# Patient Record
Sex: FEMALE | Race: WHITE | NOT HISPANIC OR LATINO | Employment: PART TIME | URBAN - METROPOLITAN AREA
[De-identification: names, ages, dates, MRNs, and addresses within clinical notes are randomized per-mention and may not be internally consistent; named-entity substitution may affect disease eponyms.]

---

## 2017-01-04 ENCOUNTER — HOSPITAL ENCOUNTER (OUTPATIENT)
Dept: OCCUPATIONAL THERAPY | Facility: CLINIC | Age: 47
Setting detail: THERAPIES SERIES
End: 2017-01-04
Attending: ORTHOPAEDIC SURGERY
Payer: COMMERCIAL

## 2017-01-04 PROCEDURE — 97035 APP MDLTY 1+ULTRASOUND EA 15: CPT | Mod: GO | Performed by: OCCUPATIONAL THERAPIST

## 2017-01-04 PROCEDURE — 40000839 ZZH STATISTIC HAND THERAPY VISIT: Performed by: OCCUPATIONAL THERAPIST

## 2017-01-04 PROCEDURE — 97110 THERAPEUTIC EXERCISES: CPT | Mod: GO | Performed by: OCCUPATIONAL THERAPIST

## 2017-01-04 PROCEDURE — 97140 MANUAL THERAPY 1/> REGIONS: CPT | Mod: GO | Performed by: OCCUPATIONAL THERAPIST

## 2017-01-06 ENCOUNTER — HOSPITAL ENCOUNTER (OUTPATIENT)
Dept: OCCUPATIONAL THERAPY | Facility: CLINIC | Age: 47
Setting detail: THERAPIES SERIES
End: 2017-01-06
Attending: ORTHOPAEDIC SURGERY
Payer: COMMERCIAL

## 2017-01-06 PROCEDURE — 40000839 ZZH STATISTIC HAND THERAPY VISIT: Performed by: OCCUPATIONAL THERAPIST

## 2017-01-06 PROCEDURE — 97035 APP MDLTY 1+ULTRASOUND EA 15: CPT | Mod: GO | Performed by: OCCUPATIONAL THERAPIST

## 2017-01-06 PROCEDURE — 97140 MANUAL THERAPY 1/> REGIONS: CPT | Mod: GO | Performed by: OCCUPATIONAL THERAPIST

## 2017-01-06 PROCEDURE — 97110 THERAPEUTIC EXERCISES: CPT | Mod: GO | Performed by: OCCUPATIONAL THERAPIST

## 2017-01-12 ENCOUNTER — HOSPITAL ENCOUNTER (OUTPATIENT)
Dept: OCCUPATIONAL THERAPY | Facility: CLINIC | Age: 47
Setting detail: THERAPIES SERIES
End: 2017-01-12
Attending: ORTHOPAEDIC SURGERY
Payer: COMMERCIAL

## 2017-01-12 PROCEDURE — 97035 APP MDLTY 1+ULTRASOUND EA 15: CPT | Mod: GO | Performed by: OCCUPATIONAL THERAPIST

## 2017-01-12 PROCEDURE — 40000839 ZZH STATISTIC HAND THERAPY VISIT: Performed by: OCCUPATIONAL THERAPIST

## 2017-01-12 PROCEDURE — 97140 MANUAL THERAPY 1/> REGIONS: CPT | Mod: GO | Performed by: OCCUPATIONAL THERAPIST

## 2017-01-12 PROCEDURE — 97110 THERAPEUTIC EXERCISES: CPT | Mod: GO | Performed by: OCCUPATIONAL THERAPIST

## 2017-01-12 NOTE — PROGRESS NOTES
Occupational Therapy Progress Note     01/12/17 0810   Notes   Note Type Progress Note   Signing Clinician's Name / Credentials   Signing clinician's name / credentials Nita CLINTON/L   Providers   Referring Physician Dr Yuli Pinto   General Information   Rxs Authorized 20    Rxs Used 10/16   Medical Diagnosis s/p wrist surgery Z98.890   Orders Evaluate And Treat As Indicated;Other   Other Orders hand therapy   Insurance HP   Start Of Care Date 12/09/16   Onset date of current episode/exacerbation 12/01/16   Surgical procedure external fixation distal radius   Date of surgery 10/10/16   Date for Next MD Appointment 01/25/17   Precautions/Limitations decrease wrist brace wear as needed, still part time at work through end of January   Subjective Measures   Current Pain level 4/10   Patient Reported % Functional Improvement 50%   Functional Improvement Reported Self care activities;Leisure Activities;Gripping;Carrying;Prehension   QuickDASH [Functional Disability Questionnaire; 0-100 (0=no dysfunction; 100=dysfunction)] (UEFI: 55/80)   Objective Measures   Objective Measures Strength   Edema   Location (anatomical) wrist    Location Left   Edema Comments 17.0 cm   ROM   Location (anatomical) wrist   Location Left   Motion Ext/Flex   ROM Comments 52/56 AROM/PROM  extension and flexion  53/55 AROM/PROM, WNL Pro/sup extension/flexion AROM/PROM  (improved)   Strength   Location Left    18# and right 45#   Chavez Pinch 9# and 15#  (improved)   Lateral Pinch 8# and right 16#  (improved)   Modalities   Modalities Ultrasound   Ultrasound -Type Pulsed 50%;2 cm sound head   Skilled Interventions To Increase Blood Flow For Improved Healing;To Increase Tissue Extensibility;To Enhance Tendon Gliding;To Enhance Joint Rom;To Decrease Pain;To Decrease Inflammation   Intensity .8 cm2   Duration (does not include the 3-5 min set up/clean up time) 10 min   Frequency 3 MHz   Location left wrist  volar and dorsum , and hand   Positioning sitting   Therapeutic Exercise   Therapeutic Exercise PROM;AAROM;Special Techniques   Skilled Interventions To Increase Blood Flow For Improved Healing;To Increase Tissue Extensibility;To Enhance Joint Rom;To Decrease Edema   Minutes of Treatment 20   AAROM   AAROM Fingers All Planes   Sets/Reps 1 set;10 reps   PROM   PROM PROM Forearm;PROM Wrist   PROM Wrist All Planes   Sets/Reps 1 set;10 reps   PROM Forearm All Planes   Sets/Reps 1 set;10 reps   Other Exer/Activities/Educ   Exercise 1 reassess ; refer to objectives and goal update   Exercise 2 velcro board   Description 2 large cylinder,, large key and medium cylinder 3-5 x each direction with the left hand   Manual   Manual MEM;STM   Skilled Interventions To Decrease Edema   Minutes of Treatment 5   STM 1st dc   Position Sitting   Description/ Technique moderate pressure applied to volar and dorsum distal left wrist.   Time 10   MEM hand;forearm   Position Sitting   Description/ Technique MEM to dorsal hand through to forearm and elbow to decrease swelling.    Time 15   Hand Goals   Hand Goals Dressing;Hygiene/Toileting;Household Chores;Work   Dressing   Current Functional Task Dressing UB;Dressing LB   Previous Performance Level Independent   Current Performance Level Mild difficulty   Goal Target Task Don/Doff bra;Don/Doff socks;Don/Doff pants;[place] pants;Button shirt;Tie shoes;Zipping;Don/doff gloves   Goal Target Performance Level Mild difficulty   Due Date 02/09/17   Date Goal Met 01/12/17   Hygiene/Toileting   Current Functional Task Styling;Washing back;Brushing/combing hair   Previous Performance Level Independent   Current Performance Level Mild difficulty   Goal Target Task Hold brush/comb and brush/comb hair;Style hair;Reach around to wipe   Goal Target Performance Level No difficulty   Due Date 02/09/17   Household Chores   Current Functional Task Washing and drying dishes;Sweeping;Gripping;Turning;Pushing    Previous Performance Level Independent   Current Performance Level Mild difficulty   Goal Target Task Hold dish rag and wash dishes;Sweep floors;Weight bear through hand to wash floors;Open a tight or new jar;Push a shopping cart   Goal Target Performance Level Mild difficulty   Due Date 02/09/17   Work   Current Functional Task Computer use;Keyboarding;Repetitive tasks   Previous Performance Level Independent   Current Performance Level Moderate difficulty   Goal Target Task Type;Mouse   Goal Target Performance Level No difficulty   Due Date 02/09/17   Assessment   Clinical Impression(s) Comments Improved functional use fo the left hand/arm for daily tasks/activities.   Response to Therapy: Improvements ROM;Flexibility;Edema;Pain;Self Care Skills;Sensitivity   Plan   Plan US, STM/MEM, TE/TA and HEP   Total Session Time   Total Session Time (In Minutes) 45       Thank you for referring Lilian To OT services to assist with improve function use of the left hand.    If you have any questions or concerns, please contact me at [phone].    Nita Gonzalez MA, OTR/L  [facility]

## 2017-01-13 ENCOUNTER — HOSPITAL ENCOUNTER (OUTPATIENT)
Dept: OCCUPATIONAL THERAPY | Facility: CLINIC | Age: 47
Setting detail: THERAPIES SERIES
End: 2017-01-13
Attending: ORTHOPAEDIC SURGERY
Payer: COMMERCIAL

## 2017-01-13 PROCEDURE — 40000839 ZZH STATISTIC HAND THERAPY VISIT: Performed by: OCCUPATIONAL THERAPIST

## 2017-01-13 PROCEDURE — 97035 APP MDLTY 1+ULTRASOUND EA 15: CPT | Mod: GO | Performed by: OCCUPATIONAL THERAPIST

## 2017-01-13 PROCEDURE — 97110 THERAPEUTIC EXERCISES: CPT | Mod: GO | Performed by: OCCUPATIONAL THERAPIST

## 2017-01-13 PROCEDURE — 97140 MANUAL THERAPY 1/> REGIONS: CPT | Mod: GO | Performed by: OCCUPATIONAL THERAPIST

## 2017-01-18 ENCOUNTER — HOSPITAL ENCOUNTER (OUTPATIENT)
Dept: OCCUPATIONAL THERAPY | Facility: CLINIC | Age: 47
Setting detail: THERAPIES SERIES
End: 2017-01-18
Attending: ORTHOPAEDIC SURGERY
Payer: COMMERCIAL

## 2017-01-18 PROCEDURE — 40000839 ZZH STATISTIC HAND THERAPY VISIT: Performed by: OCCUPATIONAL THERAPIST

## 2017-01-18 PROCEDURE — 97110 THERAPEUTIC EXERCISES: CPT | Mod: GO | Performed by: OCCUPATIONAL THERAPIST

## 2017-01-18 PROCEDURE — 97140 MANUAL THERAPY 1/> REGIONS: CPT | Mod: GO | Performed by: OCCUPATIONAL THERAPIST

## 2017-01-18 PROCEDURE — 97035 APP MDLTY 1+ULTRASOUND EA 15: CPT | Mod: GO | Performed by: OCCUPATIONAL THERAPIST

## 2017-01-20 ENCOUNTER — HOSPITAL ENCOUNTER (OUTPATIENT)
Dept: OCCUPATIONAL THERAPY | Facility: CLINIC | Age: 47
Setting detail: THERAPIES SERIES
End: 2017-01-20
Attending: ORTHOPAEDIC SURGERY
Payer: COMMERCIAL

## 2017-01-20 PROCEDURE — 97140 MANUAL THERAPY 1/> REGIONS: CPT | Mod: GO | Performed by: OCCUPATIONAL THERAPIST

## 2017-01-20 PROCEDURE — 97035 APP MDLTY 1+ULTRASOUND EA 15: CPT | Mod: GO | Performed by: OCCUPATIONAL THERAPIST

## 2017-01-20 PROCEDURE — 97110 THERAPEUTIC EXERCISES: CPT | Mod: GO | Performed by: OCCUPATIONAL THERAPIST

## 2017-01-20 PROCEDURE — 40000839 ZZH STATISTIC HAND THERAPY VISIT: Performed by: OCCUPATIONAL THERAPIST

## 2017-01-25 ENCOUNTER — RADIANT APPOINTMENT (OUTPATIENT)
Dept: GENERAL RADIOLOGY | Facility: OTHER | Age: 47
End: 2017-01-25
Attending: ORTHOPAEDIC SURGERY
Payer: COMMERCIAL

## 2017-01-25 ENCOUNTER — OFFICE VISIT (OUTPATIENT)
Dept: ORTHOPEDICS | Facility: OTHER | Age: 47
End: 2017-01-25
Payer: COMMERCIAL

## 2017-01-25 VITALS — TEMPERATURE: 98 F | WEIGHT: 222 LBS | BODY MASS INDEX: 37.9 KG/M2 | HEIGHT: 64 IN

## 2017-01-25 DIAGNOSIS — S52.592D OTHER CLOSED FRACTURE OF DISTAL END OF LEFT RADIUS WITH ROUTINE HEALING, SUBSEQUENT ENCOUNTER: Primary | ICD-10-CM

## 2017-01-25 DIAGNOSIS — S52.502D CLOSED FRACTURE OF DISTAL END OF LEFT RADIUS WITH ROUTINE HEALING: ICD-10-CM

## 2017-01-25 PROCEDURE — 73110 X-RAY EXAM OF WRIST: CPT | Mod: LT

## 2017-01-25 PROCEDURE — 99213 OFFICE O/P EST LOW 20 MIN: CPT | Performed by: ORTHOPAEDIC SURGERY

## 2017-01-25 ASSESSMENT — PAIN SCALES - GENERAL: PAINLEVEL: MILD PAIN (3)

## 2017-01-25 NOTE — Clinical Note
[address]  [address]  [facility] [address]  Phone: [phone]    January 25, 2017        Lilian Middleton  [address] [place] [address]          To whom it may concern:    RE: Lilian ROBINA Kane    Lilian is under my care and Dr. Berrios.  Lilian is still healing and will be working at a slower pace. Restrictions: Return to normal duties with right hand 6 hours per day.  May use left hand but may need breaks and at time may not be able to do all normal duties with left hand.  She must also attend her therapy sessions, this is very important for her recovery.  She will be re-evaluated in 6 weeks for these restrictions.  Its possible she could do more hours prior to 6 weeks but she would need to consult us over the phone for that.      Please contact me for questions or concerns.      Sincerely,      Bryan Mendoza PA-C

## 2017-01-25 NOTE — PATIENT INSTRUCTIONS
Encounter Diagnosis   Name Primary?     Closed fracture of distal end of left radius with routine healing Yes     Rest, ice and elevate above heart level as needed for pain control  1. We feel your x-rays look excellent today. We printed them out for you. You have healed in a good position.  2. You are making progress with your range of motion and strengthening. He still have a bit to go to have full range of motion.  3. It might take another 2-3 months to get the full range of motion and strength back.  4. We did work restrictions today.  We are up to 6 hours per day also.  Call us if you want to do more hours before our follow up.  5. Continue Occupational Therapy, this is important.  6. Do exercises on your own too.  7. We held off on pain medication today.  Ibuprofen and tylenol is fine also.  8.  Follow up with Yuli Berrios MD and/or Bryan Mendoza PA-C in 6 weeks.  We will not need xray at that time.  [employer] and SendRR may offer reliable information regarding your diagnosis and treatment plan.    THANK YOU for coming in today. If you receive a survey via [employer] or mail please let us know if there was anything you especially appreciated today or if there is any way we can improve our clinic. We appreciate your input.    GENERAL INFORMATION:  Our hours are:  Monday :     Clinic 7:30 AM-430 PM ([facility])  Tuesday:      Operating Room All Day ([facility])  Wednesday: Clinic 7:30 AM - 11:15 AM ([facility])             Clinic 1:00 PM - 4:00PM ([facility])  Thursday:     Administrative Day  Friday:          Clinic 7:30 AM - 11:15 AM ([facility])            Clinic 1:00 PM - 4:00 PM ([facility])    We are not in the office Thursdays. Therefore non- urgent calls and medical messages received on Thursday will be addressed when we are back in the office on Wednesday. Urgent  matters will be reviewed and addressed by one of our partners in the office as needed.    If lab work was done today as part of your evaluation you will generally be contacted via Tagkast, mail, or phone with the results within 1-5 days. If there is an alarming result we will contact you by phone. Lab results come back at varying times, I generally wait until all labs are resulted before making comments on results. Please note labs are automatically released to Tagkast (if you have signed up for it) once available-at times you may see these prior to my having a chance to review them as well.    If you need refills please contact your pharmacist. They will send a refill request to me to review. Please allow 3 business days for us to process all refill requests. All narcotic refills should be handled in the clinic at the time of your visit.

## 2017-01-25 NOTE — Clinical Note
[address]  [address]  [facility] [address]  Phone: [phone]    January 25, 2017        Lilian Middleton  [address] [place] [address]          To whom it may concern:    RE: Lilian Middleton    Lilian is under my care and Dr. Berrios.  Lilian is still healing and will be working at a slower pace. Restrictions: Return to normal duties with right hand 6 hours per day.  May use left hand but may need breaks and at time may not be able to do all normal duties with left hand.  She will be re-evaluated in 6 weeks for these restrictions.  Its possible she could do more hours prior to 6 weeks but she would need to consult us over the phone for that.      Please contact me for questions or concerns.      Sincerely,        Bryan Mendoza PA-C

## 2017-01-25 NOTE — PROGRESS NOTES
Orthopedic Clinic Post-Operative Note    CHIEF COMPLAINT:   Chief Complaint   Patient presents with     Surgical Followup     External Fixator Placement Left Distal Radius post op #5 dos 10/10/16        HISTORY OF PRESENT ILLNESS  Location: left wrist  Rating of Pain: 3 out of 10. Patient states the pain is getting better and also that she feels she has more energy now  Pain is better with: rest  Pain improving overall: yes  Associated Features: none  Patient concerns: work    Patient's past medical, surgical, social and family histories reviewed.     No past medical history on file.    Past Surgical History   Procedure Laterality Date     Apply external fixator upper extremity Left 10/10/2016     Procedure: APPLY EXTERNAL FIXATOR UPPER EXTREMITY;  Surgeon: Yuli Berrios MD;  Location: [facility] OR       Medications:    Current Outpatient Prescriptions on File Prior to Visit:  HYDROcodone-acetaminophen (NORCO) 5-325 MG per tablet Take 1-2 tablets by mouth 2 times daily as needed for moderate to severe pain   oxyCODONE-acetaminophen (PERCOCET) 5-325 MG per tablet Take 1 tablet by mouth every 8 hours as needed for moderate to severe pain   meloxicam (MOBIC) 7.5 MG tablet Take 1 tablet (7.5 mg) by mouth daily   Acetaminophen (TYLENOL PO) Take 500 mg by mouth as needed for mild pain or fever   traMADol (ULTRAM) 50 MG tablet Take 1 tablet (50 mg) by mouth every 6 hours as needed for moderate pain   ketoconazole (NIZORAL) 2 % cream Apply topically 2 times daily   minocycline (DYNACIN) 100 MG tablet Take 1 tablet (100 mg) by mouth 2 times daily     No current facility-administered medications on file prior to visit.    No Known Allergies    Social History     Occupational History     Not on file.     Social History Main Topics     Smoking status: Never Smoker      Smokeless tobacco: Not on file     Alcohol Use: No     Drug Use: No     Sexual Activity:     Partners: Male       Family History   Problem Relation Age of Onset  "    Other Cancer Father        REVIEW OF SYSTEMS  General: negative for, night sweats, dizziness, fatigue  Resp: No shortness of breath and no cough  CV: negative for chest pain, syncope or near-syncope  GI: negative for nausea, vomiting and diarrhea  : negative for dysuria and hematuria  Musculoskeletal: as above  Neurologic: negative for syncope   Hematologic: negative for bleeding disorder    Physical Exam:  Vitals: Temp(Src) 98  F (36.7  C)  Ht 5' 4\" (1.626 m)  Wt 222 lb (100.699 kg)  BMI 38.09 kg/m2  BMI= Body mass index is 38.09 kg/(m^2).  Constitutional: healthy, alert and no acute distress   Psychiatric: mentation appears normal and affect normal/bright  NEURO: no focal deficits  RESP: Normal with easy respirations and no use of accessory muscles to breathe, no audible wheezing or retractions  CV: regular pulse  SKIN: No erythema, rashes, excoriation, or breakdown. No evidence of infection. All external fixator incisions well-heeled   MUSCULOSKELETAL:    INSPECTION of left wrist: No gross deformities, erythema, ecchymosis, atrophy or fasciculations. Slight swelling in general around the wrist area when compared to the contralateral side    PALPATION: no tenderness on palpation of the distal radius for ulna area. DRUJ stable    ROM: patient has approximately 45  of flexion and approximately 15  of extension when compared to the contralateral side of approximately 90  flexion and 45  of extension. Patient has full supination and pronation today compared to the contralateral side. All range of motion is without catching or locking. Patient does have some pain with maximal flexion and extension.    STRENGTH: 5 out of 5  strength and 4 out of 5 best flexion and extension strength    SPECIAL TEST: none  GAIT: non-antalgic  Lymph: no palpable lymph nodes    Diagnostic Modalities:  we did 3 views of the left wrist today. Fractures healed. The position is acceptable.  Independent visualization of the images " was performed.      Impression: 3 months 15 days status post left distal radius fracture status post close reduction in external fixation    Plan:   Patient Instructions:   1. We feel your x-rays look excellent today. We printed them out for you. You have healed in a good position.  2. You are making progress with your range of motion and strengthening. He still have a bit to go to have full range of motion.  3. It might take another 2-3 months to get the full range of motion and strength back.  4. We did work restrictions today.  We are up to 6 hours per day also.  Call us if you want to do more hours before our follow up.  5. Continue Occupational Therapy, this is important.  6. Do exercises on your own too.  7. We held off on pain medication today.  Ibuprofen and tylenol is fine also.  8.  Follow up with Yuli Berrios MD and/or Bryan Mendoza PA-C in 6 weeks.  We will not need xray at that time.  Re-x-ray on return: No    Scribed by Bryan Mendoza PA-C on 1/25/2017 at 10:04 AM, based on Dr. Yuli Berrios's statements to me.    This note was dictated with [employer].    ASHA Woods MD

## 2017-01-25 NOTE — MR AVS SNAPSHOT
After Visit Summary   1/25/2017    Lilian Middleton    MRN: [id number]           Patient Information     Date Of Birth          1970        Visit Information        Provider Department      1/25/2017 9:00 AM Yuli Berrios MD [facility]        Today's Diagnoses     Closed fracture of distal end of left radius with routine healing    -  1       Care Instructions    Encounter Diagnosis   Name Primary?     Closed fracture of distal end of left radius with routine healing Yes     Rest, ice and elevate above heart level as needed for pain control  1. We feel your x-rays look excellent today. We printed them out for you. You have healed in a good position.  2. You are making progress with your range of motion and strengthening. He still have a bit to go to have full range of motion.  3. It might take another 2-3 months to get the full range of motion and strength back.  4. We did work restrictions today.  We are up to 6 hours per day also.  Call us if you want to do more hours before our follow up.  5. Continue Occupational Therapy, this is important.  6. Do exercises on your own too.  7. We held off on pain medication today.  Ibuprofen and tylenol is fine also.  8.  Follow up with Yuli Berrios MD and/or Bryan Mendoza PA-C in 6 weeks.  We will not need xray at that time.  [employer] and [employer]: [employer] may offer reliable information regarding your diagnosis and treatment plan.    THANK YOU for coming in today. If you receive a survey via Youxinpai or mail please let us know if there was anything you especially appreciated today or if there is any way we can improve our clinic. We appreciate your input.    GENERAL INFORMATION:  Our hours are:  Monday :     Clinic 7:30 AM-430 PM ([facility])  Tuesday:      Operating Room All Day ([facility])  Wednesday: Clinic 7:30 AM - 11:15 AM ([facility])             Clinic 1:00 PM -  4:00PM ([facility])  Thursday:     Administrative Day  Friday:          Clinic 7:30 AM - 11:15 AM ([facility])            Clinic 1:00 PM - 4:00 PM ([facility])    We are not in the office Thursdays. Therefore non- urgent calls and medical messages received on Thursday will be addressed when we are back in the office on Wednesday. Urgent matters will be reviewed and addressed by one of our partners in the office as needed.    If lab work was done today as part of your evaluation you will generally be contacted via MaxLinear, mail, or phone with the results within 1-5 days. If there is an alarming result we will contact you by phone. Lab results come back at varying times, I generally wait until all labs are resulted before making comments on results. Please note labs are automatically released to [employer] (if you have signed up for it) once available-at times you may see these prior to my having a chance to review them as well.    If you need refills please contact your pharmacist. They will send a refill request to me to review. Please allow 3 business days for us to process all refill requests. All narcotic refills should be handled in the clinic at the time of your visit.         Follow-ups after your visit        Your next 10 appointments already scheduled     Jan 27, 2017  7:30 AM   Treatment 45 with Nita Gonzalez, OT   [facility] Occupational Therapy ([facility])    [address] [place] [address] [address] [place] [address]   [phone]            Feb 03, 2017  7:30 AM   Treatment 45 with Cande Hassan, DIETER   [facility] Occupational Therapy ([facility])    [facility] [facility] [address] [place] [address]   [phone]            Feb 10, 2017  7:30 AM   Treatment 45 with Nita Gonzalez OT   [facility] Occupational Therapy ([facility])    [facility] [address] [place] [address]  "  [phone]            2017  7:30 AM   Treatment 45 with Nita Gonzalez OT   [facility] Occupational Therapy ([facility])    [address] [facility] [address] [place] [address] [phone]              Who to contact     If you have questions or need follow up information about today's clinic visit or your schedule please contact [facility] LUPE HARDY directly at [phone].  Normal or non-critical lab and imaging results will be communicated to you by MyChart, letter or phone within 4 business days after the clinic has received the results. If you do not hear from us within 7 days, please contact the clinic through Cometahart or phone. If you have a critical or abnormal lab result, we will notify you by phone as soon as possible.  Submit refill requests through resmio or call your pharmacy and they will forward the refill request to us. Please allow 3 business days for your refill to be completed.          Additional Information About Your Visit        resmio Information     resmio lets you send messages to your doctor, view your test results, renew your prescriptions, schedule appointments and more. To sign up, go to [email or web address] . Click on \"Log in\" on the left side of the screen, which will take you to the Welcome page. Then click on \"Sign up Now\" on the right side of the page.     You will be asked to enter the access code listed below, as well as some personal information. Please follow the directions to create your username and password.     Your access code is: [email or web address]  Expires: 2017  9:54 AM     Your access code will  in 90 days. If you need help or a new code, please call your [place] clinic or [phone].        Care EveryWhere ID     This is your Care EveryWhere ID. This could be used by other organizations to access your [place] medical records  [id number]        Your Vitals Were     Temperature Height BMI (Body Mass Index)       " "      98  F (36.7  C) 5' 4\" (1.626 m) 38.09 kg/m2          Blood Pressure from Last 3 Encounters:   10/10/16 143/64   10/05/16 116/78   10/01/16 133/83    Weight from Last 3 Encounters:   01/25/17 222 lb (100.699 kg)   12/21/16 222 lb (100.699 kg)   11/23/16 222 lb (100.699 kg)               Primary Care Provider Office Phone # Fax #    [facility] [phone]       [address] [place] [address] [address]  [facility] [address]        Thank you!     Thank you for choosing [facility]  for your care. Our goal is always to provide you with excellent care. Hearing back from our patients is one way we can continue to improve our services. Please take a few minutes to complete the written survey that you may receive in the mail after your visit with us. Thank you!             Your Updated Medication List - Protect others around you: Learn how to safely use, store and throw away your medicines at www.disposemymeds.org.          This list is accurate as of: 1/25/17  9:54 AM.  Always use your most recent med list.                   Brand Name Dispense Instructions for use    HYDROcodone-acetaminophen 5-325 MG per tablet    NORCO    40 tablet    Take 1-2 tablets by mouth 2 times daily as needed for moderate to severe pain       ketoconazole 2 % cream    NIZORAL    60 g    Apply topically 2 times daily       meloxicam 7.5 MG tablet    MOBIC    30 tablet    Take 1 tablet (7.5 mg) by mouth daily       minocycline 100 MG tablet    DYNACIN    180 tablet    Take 1 tablet (100 mg) by mouth 2 times daily       oxyCODONE-acetaminophen 5-325 MG per tablet    PERCOCET    40 tablet    Take 1 tablet by mouth every 8 hours as needed for moderate to severe pain       traMADol 50 MG tablet    ULTRAM    60 tablet    Take 1 tablet (50 mg) by mouth every 6 hours as needed for moderate pain       TYLENOL PO      Take 500 mg by mouth as needed for mild pain or fever         "

## 2017-01-25 NOTE — Clinical Note
[address]  [address]  [facility] [address]  Phone: [phone]    January 25, 2017        Lilian Middleton  [address] [place] [address]          To whom it may concern:    RE: Lilian ROBINA Middleton    Lilian is under my care and Dr. Berrios.  Lilian is still healing and will be working at a slower pace. Restrictions: Return to normal duties with right hand 6 hours per day (the 6 hours day will start on 1/30/16, she will be doing 4 hours per day until that date).  May use left hand but may need breaks and at time may not be able to do all normal duties with left hand.  She must also attend her therapy sessions, this is very important for her recovery.  She will be re-evaluated in 6 weeks for these restrictions.  Its possible she could do more hours prior to 6 weeks but she would need to consult us over the phone for that.      Please contact me for questions or concerns.      Sincerely,        Bryan Mendoza PA-C

## 2017-01-25 NOTE — NURSING NOTE
"Chief Complaint   Patient presents with     Surgical Followup     External Fixator Placement Left Distal Radius post op #5 dos 10/10/16        Initial Temp(Src) 98  F (36.7  C)  Ht 1.626 m (5' 4\")  Wt 100.699 kg (222 lb)  BMI 38.09 kg/m2 Estimated body mass index is 38.09 kg/(m^2) as calculated from the following:    Height as of this encounter: 1.626 m (5' 4\").    Weight as of this encounter: 100.699 kg (222 lb).  BP completed using cuff size: NA (Not Taken)    Agustina Sigala MA      "

## 2017-01-27 ENCOUNTER — HOSPITAL ENCOUNTER (OUTPATIENT)
Dept: OCCUPATIONAL THERAPY | Facility: CLINIC | Age: 47
Setting detail: THERAPIES SERIES
End: 2017-01-27
Attending: ORTHOPAEDIC SURGERY
Payer: COMMERCIAL

## 2017-01-27 PROCEDURE — 40000839 ZZH STATISTIC HAND THERAPY VISIT: Performed by: OCCUPATIONAL THERAPIST

## 2017-01-27 PROCEDURE — 97140 MANUAL THERAPY 1/> REGIONS: CPT | Mod: GO | Performed by: OCCUPATIONAL THERAPIST

## 2017-01-27 PROCEDURE — 97110 THERAPEUTIC EXERCISES: CPT | Mod: GO | Performed by: OCCUPATIONAL THERAPIST

## 2017-01-27 PROCEDURE — 97035 APP MDLTY 1+ULTRASOUND EA 15: CPT | Mod: GO | Performed by: OCCUPATIONAL THERAPIST

## 2017-02-03 ENCOUNTER — HOSPITAL ENCOUNTER (OUTPATIENT)
Dept: OCCUPATIONAL THERAPY | Facility: CLINIC | Age: 47
Setting detail: THERAPIES SERIES
End: 2017-02-03
Attending: ORTHOPAEDIC SURGERY
Payer: COMMERCIAL

## 2017-02-03 PROCEDURE — 40000839 ZZH STATISTIC HAND THERAPY VISIT: Performed by: OCCUPATIONAL THERAPIST

## 2017-02-03 PROCEDURE — 97140 MANUAL THERAPY 1/> REGIONS: CPT | Mod: GO | Performed by: OCCUPATIONAL THERAPIST

## 2017-02-03 PROCEDURE — 97035 APP MDLTY 1+ULTRASOUND EA 15: CPT | Mod: GO | Performed by: OCCUPATIONAL THERAPIST

## 2017-02-03 PROCEDURE — 97110 THERAPEUTIC EXERCISES: CPT | Mod: GO | Performed by: OCCUPATIONAL THERAPIST

## 2017-02-10 ENCOUNTER — HOSPITAL ENCOUNTER (OUTPATIENT)
Dept: OCCUPATIONAL THERAPY | Facility: CLINIC | Age: 47
Setting detail: THERAPIES SERIES
End: 2017-02-10
Attending: ORTHOPAEDIC SURGERY
Payer: COMMERCIAL

## 2017-02-10 PROCEDURE — 97035 APP MDLTY 1+ULTRASOUND EA 15: CPT | Mod: GO | Performed by: OCCUPATIONAL THERAPIST

## 2017-02-10 PROCEDURE — 40000839 ZZH STATISTIC HAND THERAPY VISIT: Performed by: OCCUPATIONAL THERAPIST

## 2017-02-10 PROCEDURE — 97110 THERAPEUTIC EXERCISES: CPT | Mod: GO | Performed by: OCCUPATIONAL THERAPIST

## 2017-02-10 NOTE — PROGRESS NOTES
Occupational Therapy Discharge Progress Note     02/10/17 0724   Notes   Note Type Discharge Summary   Signing Clinician's Name / Credentials   Signing clinician's name / credentials Nita CLINTON/L   Providers   Referring Physician Dr Yuli Pinto   General Information   Rxs Authorized 20    Rxs Used 16/16   Medical Diagnosis s/p wrist surgery Z98.890   Orders Evaluate And Treat As Indicated;Other   Other Orders hand therapy   Insurance HP   Start Of Care Date 12/09/16   Onset date of current episode/exacerbation 12/01/16   Surgical procedure external fixation distal radius   Date of surgery 10/10/16   Date for Next MD Appointment 01/25/17   Precautions/Limitations decrease wrist brace wear as needed, still part time at work through end of January   Subjective Measures   Subjective The patient agrees to discharge this date.  Indep HEP.   Current Pain level 3/10   Patient Reported % Functional Improvement 75%+   Functional Improvement Reported Work Activities;Leisure Activities;Self care activities;Gripping;Prehension;Carrying   QuickDASH [Functional Disability Questionnaire; 0-100 (0=no dysfunction; 100=dysfunction)] (61/80 UEFI)   Edema   Location (anatomical) wrist   Edema Comments none   ROM   Location (anatomical) wrist   Location Left   Motion Ext/Flex   ROM Comments 58 PROM ext, 70 flex.  PROM   Strength    left 20#   Chavez Pinch 9#   Lateral Pinch 12#   Modalities   Modalities Ultrasound   Ultrasound -Type Pulsed 50%;2 cm sound head   Skilled Interventions To Increase Blood Flow For Improved Healing;To Increase Tissue Extensibility;To Enhance Tendon Gliding;To Enhance Joint Rom;To Decrease Pain;To Decrease Inflammation   Intensity .8 cm2   Duration (does not include the 3-5 min set up/clean up time) 10 min   Frequency 3 MHz   Location left wrist volar and dorsum , and hand   Positioning sitting   Therapeutic Exercise   Therapeutic Exercise Other  Exercises/Activities   Skilled Interventions To Increase Blood Flow For Improved Healing   Minutes of Treatment 25   Other Exer/Activities/Educ   Other Exer/Activities/Educ - All exercises are part of HEP unless otherwise noted Exercise 1   Exercise 1 reviewed POC, goals and objectives; refer above   Hand Goals   Hand Goals Dressing;Hygiene/Toileting;Household Chores;Work   Dressing   Current Functional Task Dressing UB;Dressing LB   Previous Performance Level Independent   Current Performance Level Mild difficulty   Goal Target Task Don/Doff bra;Don/Doff socks;Don/Doff pants;[place] pants;Button shirt;Tie shoes;Zipping;Don/doff gloves   Goal Target Performance Level Mild difficulty   Due Date 02/09/17   Date Goal Met 01/12/17   Hygiene/Toileting   Current Functional Task Styling;Washing back;Brushing/combing hair   Previous Performance Level Independent   Goal Target Task Hold brush/comb and brush/comb hair;Style hair;Reach around to wipe   Goal Target Performance Level No difficulty   Due Date 02/17/17   Date Goal Met 02/10/17   Household Chores   Current Functional Task Washing and drying dishes;Sweeping;Gripping;Turning;Pushing   Previous Performance Level Independent   Goal Target Task Hold dish rag and wash dishes;Sweep floors;Weight bear through hand to wash floors;Open a tight or new jar;Push a shopping cart   Goal Target Performance Level Mild difficulty   Due Date 02/17/17   Date Goal Met 02/10/17   Work   Current Functional Task Computer use;Keyboarding;Repetitive tasks   Previous Performance Level Independent   Goal Target Task Type;Mouse   Goal Target Performance Level No difficulty   Due Date 02/17/17   Date Goal Met 02/10/17   Plan   Home program continue HEP   Updates to plan of care Dischagre this date   Total Session Time   Total Session Time (In Minutes) 35      02/10/17 0724   Notes   Note Type Discharge Summary   Signing Clinician's Name / Credentials   Signing clinician's name / credentials  Nita CLINTON/L   Providers   Referring Physician Dr Yuli Pinto   General Information   Rxs Authorized 20    Rxs Used 16/16   Medical Diagnosis s/p wrist surgery Z98.890   Orders Evaluate And Treat As Indicated;Other   Other Orders hand therapy   Insurance    Start Of Care Date 12/09/16   Onset date of current episode/exacerbation 12/01/16   Surgical procedure external fixation distal radius   Date of surgery 10/10/16   Date for Next MD Appointment 01/25/17   Precautions/Limitations decrease wrist brace wear as needed, still part time at work through end of January   Subjective Measures   Subjective The patient agrees to discharge this date.  Indep HEP.   Current Pain level 3/10   Patient Reported % Functional Improvement 75%+   Functional Improvement Reported Work Activities;Leisure Activities;Self care activities;Gripping;Prehension;Carrying   QuickDASH [Functional Disability Questionnaire; 0-100 (0=no dysfunction; 100=dysfunction)] (61/80 UEFI)   Edema   Location (anatomical) wrist   Edema Comments none   ROM   Location (anatomical) wrist   Location Left   Motion Ext/Flex   ROM Comments 58 PROM ext, 70 flex.  PROM   Strength    left 20#   Chavez Pinch 9#   Lateral Pinch 12#   Modalities   Modalities Ultrasound   Ultrasound -Type Pulsed 50%;2 cm sound head   Skilled Interventions To Increase Blood Flow For Improved Healing;To Increase Tissue Extensibility;To Enhance Tendon Gliding;To Enhance Joint Rom;To Decrease Pain;To Decrease Inflammation   Intensity .8 cm2   Duration (does not include the 3-5 min set up/clean up time) 10 min   Frequency 3 MHz   Location left wrist volar and dorsum , and hand   Positioning sitting   Therapeutic Exercise   Therapeutic Exercise Other Exercises/Activities   Skilled Interventions To Increase Blood Flow For Improved Healing   Minutes of Treatment 25   Other Exer/Activities/Educ   Other Exer/Activities/Educ - All exercises are part of HEP unless otherwise  noted Exercise 1   Exercise 1 reviewed POC, goals and objectives; refer above   Hand Goals   Hand Goals Dressing;Hygiene/Toileting;Household Chores;Work   Dressing   Current Functional Task Dressing UB;Dressing LB   Previous Performance Level Independent   Current Performance Level Mild difficulty   Goal Target Task Don/Doff bra;Don/Doff socks;Don/Doff pants;[place] pants;Button shirt;Tie shoes;Zipping;Don/doff gloves   Goal Target Performance Level Mild difficulty   Due Date 02/09/17   Date Goal Met 01/12/17   Hygiene/Toileting   Current Functional Task Styling;Washing back;Brushing/combing hair   Previous Performance Level Independent   Goal Target Task Hold brush/comb and brush/comb hair;Style hair;Reach around to wipe   Goal Target Performance Level No difficulty   Due Date 02/17/17   Date Goal Met 02/10/17   Household Chores   Current Functional Task Washing and drying dishes;Sweeping;Gripping;Turning;Pushing   Previous Performance Level Independent   Goal Target Task Hold dish rag and wash dishes;Sweep floors;Weight bear through hand to wash floors;Open a tight or new jar;Push a shopping cart   Goal Target Performance Level Mild difficulty   Due Date 02/17/17   Date Goal Met 02/10/17   Work   Current Functional Task Computer use;Keyboarding;Repetitive tasks   Previous Performance Level Independent   Goal Target Task Type;Mouse   Goal Target Performance Level No difficulty   Due Date 02/17/17   Date Goal Met 02/10/17   Plan   Home program continue HEP   Updates to plan of care Dischagre this date   Total Session Time   Total Session Time (In Minutes) 35     Thank you for referring Lilian To OT services to assist with decrease pain and improve functional use of the left hand.    If you have any questions or concerns, please contact me at [phone].    Nita Gonzalez MA, OTR/L  [facility]

## 2017-03-08 ENCOUNTER — OFFICE VISIT (OUTPATIENT)
Dept: ORTHOPEDICS | Facility: OTHER | Age: 47
End: 2017-03-08
Payer: COMMERCIAL

## 2017-03-08 VITALS — HEIGHT: 64 IN | BODY MASS INDEX: 37.9 KG/M2 | TEMPERATURE: 98.5 F | WEIGHT: 222 LBS

## 2017-03-08 DIAGNOSIS — S52.502D CLOSED FRACTURE OF DISTAL END OF LEFT RADIUS WITH ROUTINE HEALING, UNSPECIFIED FRACTURE MORPHOLOGY, SUBSEQUENT ENCOUNTER: Primary | ICD-10-CM

## 2017-03-08 DIAGNOSIS — Z98.890 STATUS POST WRIST SURGERY: ICD-10-CM

## 2017-03-08 PROCEDURE — 99212 OFFICE O/P EST SF 10 MIN: CPT | Performed by: ORTHOPAEDIC SURGERY

## 2017-03-08 ASSESSMENT — PAIN SCALES - GENERAL: PAINLEVEL: MILD PAIN (3)

## 2017-03-08 NOTE — NURSING NOTE
"Chief Complaint   Patient presents with     Surgical Followup     External Fixator Placement Left Distal Radius post op #6 dos 10/10/16        Initial Temp 98.5  F (36.9  C) (Temporal)  Ht 5' 4\" (1.626 m)  Wt 222 lb (100.7 kg)  BMI 38.11 kg/m2 Estimated body mass index is 38.11 kg/(m^2) as calculated from the following:    Height as of this encounter: 5' 4\" (1.626 m).    Weight as of this encounter: 222 lb (100.7 kg).  Medication Reconciliation: complete     Gaby Kelley CMA (AAMA)      "

## 2017-03-08 NOTE — LETTER
[address]  [address]  [facility] [address]  Phone: [phone]    March 8, 2017        Lilian Middleton  [address] [place] [address]          To whom it may concern:    RE: Lilian R Middleton    Lilian Middleton is a patient of nick and Dr. Berrios.  She will return to work with no restrictions on Monday 3/13/2017.    Please note that she is still recovering.     Please contact me for questions or concerns.      Sincerely,      Bryan Mendoza PA-C

## 2017-03-08 NOTE — PATIENT INSTRUCTIONS
Encounter Diagnoses   Name Primary?     Closed fracture of distal end of left radius with routine healing, unspecified fracture morphology, subsequent encounter Yes     Status post wrist surgery      Rest, ice and elevate above heart level as needed for pain control  1. Your wrist looks good today. I do believe you have made progress on your range of motion.  2. You still need to work on your flexion and extension a little bit but you are getting there. As range of motion gets better your pain will get better also.  3. We did a work out today. You'll go back to work without restrictions on Monday. We included a note that you are still recovering.  4. Follow up with Yuli Berrios MD and/or Bryan Mendoza PA-C in 2 months.  You can cancel if you are doing great.   [employer] and [employer] may offer reliable information regarding your diagnosis and treatment plan.    THANK YOU for coming in today. If you receive a survey via [employer] or mail please let us know if there was anything you especially appreciated today or if there is any way we can improve our clinic. We appreciate your input.    GENERAL INFORMATION:  Our hours are:  Monday :     Clinic 7:30 AM-430 PM ([facility])  Tuesday:      Operating Room All Day ([facility])  Wednesday: Clinic 7:30 AM - 11:15 AM ([facility])             Clinic 1:00 PM - 4:00PM ([facility])  Thursday:     Administrative Day  Friday:          Clinic 7:30 AM - 11:15 AM ([facility])            Clinic 1:00 PM - 4:00 PM ([facility])    We are not in the office Thursdays. Therefore non- urgent calls and medical messages received on Thursday will be addressed when we are back in the office on Wednesday. Urgent matters will be reviewed and addressed by one of our partners in the office as needed.    If lab work was done today as part of your evaluation you will  generally be contacted via [employer], mail, or phone with the results within 1-5 days. If there is an alarming result we will contact you by phone. Lab results come back at varying times, I generally wait until all labs are resulted before making comments on results. Please note labs are automatically released to [employer] (if you have signed up for it) once available-at times you may see these prior to my having a chance to review them as well.    If you need refills please contact your pharmacist. They will send a refill request to me to review. Please allow 3 business days for us to process all refill requests. All narcotic refills should be handled in the clinic at the time of your visit.

## 2017-03-08 NOTE — PROGRESS NOTES
"Orthopedic Clinic Post-Operative Note    CHIEF COMPLAINT:   Chief Complaint   Patient presents with     Surgical Followup     External Fixator Placement Left Distal Radius post op #6 dos 10/10/16        HISTORY OF PRESENT ILLNESS  Location: left wrist  Rating of Pain: 3 out of 10  Pain is better with: rest  Pain improving overall: yes  Associated Features: none  Patient concerns: some continued stiffness    Patient's past medical, surgical, social and family histories reviewed.     No past medical history on file.    Past Surgical History   Procedure Laterality Date     Apply external fixator upper extremity Left 10/10/2016     Procedure: APPLY EXTERNAL FIXATOR UPPER EXTREMITY;  Surgeon: Yuli Berrios MD;  Location: [facility] OR       Medications:    Current Outpatient Prescriptions on File Prior to Visit:  Acetaminophen (TYLENOL PO) Take 500 mg by mouth as needed for mild pain or fever Reported on 3/8/2017   ketoconazole (NIZORAL) 2 % cream Apply topically 2 times daily (Patient not taking: Reported on 3/8/2017)     No current facility-administered medications on file prior to visit.     No Known Allergies    Social History     Occupational History     Not on file.     Social History Main Topics     Smoking status: Never Smoker     Smokeless tobacco: Not on file     Alcohol use No     Drug use: No     Sexual activity: Yes     Partners: Male       Family History   Problem Relation Age of Onset     Other Cancer Father        REVIEW OF SYSTEMS  General: negative for, night sweats, dizziness, fatigue  Resp: No shortness of breath and no cough  CV: negative for chest pain, syncope or near-syncope  GI: negative for nausea, vomiting and diarrhea  : negative for dysuria and hematuria  Musculoskeletal: as above  Neurologic: negative for syncope   Hematologic: negative for bleeding disorder    Physical Exam:  Vitals: Temp 98.5  F (36.9  C) (Temporal)  Ht 5' 4\" (1.626 m)  Wt 222 lb (100.7 kg)  BMI 38.11 kg/m2  BMI= Body " mass index is 38.11 kg/(m^2).  Constitutional: healthy, alert and no acute distress   Psychiatric: mentation appears normal and affect normal/bright  NEURO: no focal deficits, CMS intact left upper extremity  RESP: Normal with easy respirations and no use of accessory muscles to breathe, no audible wheezing or retractions  CV: +2 radial pulse  SKIN: No erythema, rashes, excoriation, or breakdown. No evidence of infection. All external fixator sites well-heeled   MUSCULOSKELETAL:    INSPECTION of left wrist: No gross deformities, erythema, edema, ecchymosis, atrophy or fasciculations.     PALPATION: no major tenderness to palpation of the wrist and/or forearm. No increased warmth.    ROM: patient has approximately 60  of flexion with active range of motion on her left wrist. She has 35  of extension. Her contralateral wrist has approximately 90  of flexion and 45  extension. Patient has. A [place] and pronation. There is no catching or locking with any of the range of motion today and no major pain. She does have some pain with maximal extension and flexion.     STRENGTH: 5 out of 5  and thumb     SPECIAL TEST:  none  GAIT: non-antalgic  Lymph: no palpable lymph nodes    Diagnostic Modalities:  None today.      Impression:  approximately 5 months status post left distal radius fracture external fixator placement      Patient Instructions:   1. Your wrist looks good today. I do believe you have made progress on your range of motion.  2. You still need to work on your flexion and extension a little bit but you are getting there. As range of motion gets better your pain will get better also.  3. We did a work out today. You'll go back to work without restrictions on Monday. We included a note that you are still recovering.  4. Follow up with Yuli Berrios MD and/or Bryan Mendoza PA-C in 2 months. If you are doing well you can cancel that appointment and CSM as needed basis.  Re-x-ray on return: No    Scribed by  Bryan Mendoza PA-C on 3/8/2017 at 9:27 AM, based on Dr. Yuli Berrios's statements to me.    This note was dictated with [facility].    ASHA Woods MD

## 2017-05-12 ENCOUNTER — OFFICE VISIT (OUTPATIENT)
Dept: ORTHOPEDICS | Facility: OTHER | Age: 47
End: 2017-05-12
Payer: COMMERCIAL

## 2017-05-12 VITALS — WEIGHT: 228 LBS | BODY MASS INDEX: 38.93 KG/M2 | HEIGHT: 64 IN | TEMPERATURE: 97.7 F

## 2017-05-12 DIAGNOSIS — Z98.890 STATUS POST WRIST SURGERY: ICD-10-CM

## 2017-05-12 DIAGNOSIS — S52.502D CLOSED FRACTURE OF DISTAL END OF LEFT RADIUS WITH ROUTINE HEALING, UNSPECIFIED FRACTURE MORPHOLOGY, SUBSEQUENT ENCOUNTER: Primary | ICD-10-CM

## 2017-05-12 PROCEDURE — 99212 OFFICE O/P EST SF 10 MIN: CPT | Performed by: ORTHOPAEDIC SURGERY

## 2017-05-12 ASSESSMENT — PAIN SCALES - GENERAL: PAINLEVEL: NO PAIN (0)

## 2017-05-12 NOTE — LETTER
5/12/2017       RE: Lilian Middleton  [address] [place] [address]           Dear Colleague,    Thank you for referring your patient, Lilian Middleton, to the [facility]. Please see a copy of my visit note below.    Orthopedic Clinic Post-Operative Note    CHIEF COMPLAINT:   Chief Complaint   Patient presents with     Surgical Followup     External Fixator Placement Left Distal Radius post op #7dos 10/10/16        HISTORY OF PRESENT ILLNESS  Doing HEP, recently fell with some bilateral hand pain     Patient's past medical, surgical, social and family histories reviewed.     No past medical history on file.    Past Surgical History:   Procedure Laterality Date     APPLY EXTERNAL FIXATOR UPPER EXTREMITY Left 10/10/2016    Procedure: APPLY EXTERNAL FIXATOR UPPER EXTREMITY;  Surgeon: Yuli Berrios MD;  Location:  OR       Medications:    Current Outpatient Prescriptions on File Prior to Visit:  Acetaminophen (TYLENOL PO) Take 500 mg by mouth as needed for mild pain or fever Reported on 3/8/2017   ketoconazole (NIZORAL) 2 % cream Apply topically 2 times daily (Patient not taking: Reported on 3/8/2017)     No current facility-administered medications on file prior to visit.     No Known Allergies    Social History     Occupational History     Not on file.     Social History Main Topics     Smoking status: Never Smoker     Smokeless tobacco: Not on file     Alcohol use No     Drug use: No     Sexual activity: Yes     Partners: Male       Family History   Problem Relation Age of Onset     Other Cancer Father        REVIEW OF SYSTEMS  General: negative for, night sweats, dizziness, fatigue  Resp: No shortness of breath and no cough  CV: negative for chest pain, syncope or near-syncope  GI: negative for nausea, vomiting and diarrhea  : negative for dysuria and hematuria  Musculoskeletal: as above  Neurologic: negative for syncope   Hematologic: negative for bleeding  "disorder    Physical Exam:  Vitals: Temp 97.7  F (36.5  C)  Ht 1.626 m (5' 4\")  Wt 103.4 kg (228 lb)  BMI 39.14 kg/m2  BMI= Body mass index is 39.14 kg/(m^2).  Constitutional: healthy, alert and no acute distress   Psychiatric: mentation appears normal and affect normal/bright  NEURO: no focal deficits  SKIN: .well healed, no erythema, no incision breakdown and no drainage.  JOINT/EXTREMITIES: Wrist Exam: WRIST:  Inspection: swelling mild  Palpation: Tender: none  Range of Motion: normal  Strength: no deficits    GAIT: non-antalgic    Diagnostic Modalities:  None today.      Impression:   Chief Complaint   Patient presents with     Surgical Followup     External Fixator Placement Left Distal Radius post op #7dos 10/10/16    7 months out doing well    Plan:   Activity: Upper extremity:  no restrictions  Staples/Sutures out: Not applicable.  Pain controlled: Yes. Continue to use: Percocet  Immobilzation: support sleeve as needed  Physical Therapy: HEP  Compression  Return to clinic PRN, or sooner as needed for changes.    Re-x-ray on return: No    Yuli Berrios M.D.    Again, thank you for allowing me to participate in the care of your patient.        Sincerely,              Yuli Berrios MD    "

## 2017-05-12 NOTE — MR AVS SNAPSHOT
"              After Visit Summary   2017    Lilian Middleton    MRN: [id number]           Patient Information     Date Of Birth          1970        Visit Information        Provider Department      2017 1:30 PM Yuli Berrios MD [facility]        Today's Diagnoses     Closed fracture of distal end of left radius with routine healing, unspecified fracture morphology, subsequent encounter    -  1    Status post wrist surgery           Follow-ups after your visit        Who to contact     If you have questions or need follow up information about today's clinic visit or your schedule please contact [facility] directly at [phone].  Normal or non-critical lab and imaging results will be communicated to you by [employer], letter or phone within 4 business days after the clinic has received the results. If you do not hear from us within 7 days, please contact the clinic through [employer] or phone. If you have a critical or abnormal lab result, we will notify you by phone as soon as possible.  Submit refill requests through [employer] or call your pharmacy and they will forward the refill request to us. Please allow 3 business days for your refill to be completed.          Additional Information About Your Visit        MyChart Information     [employer] lets you send messages to your doctor, view your test results, renew your prescriptions, schedule appointments and more. To sign up, go to [email or web address] . Click on \"Log in\" on the left side of the screen, which will take you to the Welcome page. Then click on \"Sign up Now\" on the right side of the page.     You will be asked to enter the access code listed below, as well as some personal information. Please follow the directions to create your username and password.     Your access code is: [email or web address]  Expires: 8/10/2017  2:03 PM     Your access code will  in 90 days. If you need help or a new code, please call " "your [facility] clinic or [phone].        Care EveryWhere ID     This is your Care EveryWhere ID. This could be used by other organizations to access your [facility] medical records  [id number]        Your Vitals Were     Temperature Height BMI (Body Mass Index)             97.7  F (36.5  C) 1.626 m (5' 4\") 39.14 kg/m2          Blood Pressure from Last 3 Encounters:   10/10/16 143/64   10/05/16 116/78   10/01/16 133/83    Weight from Last 3 Encounters:   05/12/17 103.4 kg (228 lb)   03/08/17 100.7 kg (222 lb)   01/25/17 100.7 kg (222 lb)              Today, you had the following     No orders found for display       Primary Care Provider Office Phone # Fax #    [facility] [phone]       [address] [place] [address]  [facility] [address]        Thank you!     Thank you for choosing [facility]  for your care. Our goal is always to provide you with excellent care. Hearing back from our patients is one way we can continue to improve our services. Please take a few minutes to complete the written survey that you may receive in the mail after your visit with us. Thank you!             Your Updated Medication List - Protect others around you: Learn how to safely use, store and throw away your medicines at www.disposemymeds.org.          This list is accurate as of: 5/12/17  2:03 PM.  Always use your most recent med list.                   Brand Name Dispense Instructions for use    ketoconazole 2 % cream    NIZORAL    60 g    Apply topically 2 times daily       TYLENOL PO      Take 500 mg by mouth as needed for mild pain or fever Reported on 3/8/2017         "

## 2017-05-12 NOTE — NURSING NOTE
"Chief Complaint   Patient presents with     Surgical Followup     External Fixator Placement Left Distal Radius post op #7dos 10/10/16        Initial Temp 97.7  F (36.5  C)  Ht 1.626 m (5' 4\")  Wt 103.4 kg (228 lb)  BMI 39.14 kg/m2 Estimated body mass index is 39.14 kg/(m^2) as calculated from the following:    Height as of this encounter: 1.626 m (5' 4\").    Weight as of this encounter: 103.4 kg (228 lb).  Medication Reconciliation: complete    BP completed using cuff size: NA (Not Taken)    Agustina Sigala MA      "

## 2017-05-12 NOTE — PROGRESS NOTES
"Orthopedic Clinic Post-Operative Note    CHIEF COMPLAINT:   Chief Complaint   Patient presents with     Surgical Followup     External Fixator Placement Left Distal Radius post op #7dos 10/10/16        HISTORY OF PRESENT ILLNESS  Doing HEP, recently fell with some bilateral hand pain     Patient's past medical, surgical, social and family histories reviewed.     No past medical history on file.    Past Surgical History:   Procedure Laterality Date     APPLY EXTERNAL FIXATOR UPPER EXTREMITY Left 10/10/2016    Procedure: APPLY EXTERNAL FIXATOR UPPER EXTREMITY;  Surgeon: Yuli Berrios MD;  Location:  OR       Medications:    Current Outpatient Prescriptions on File Prior to Visit:  Acetaminophen (TYLENOL PO) Take 500 mg by mouth as needed for mild pain or fever Reported on 3/8/2017   ketoconazole (NIZORAL) 2 % cream Apply topically 2 times daily (Patient not taking: Reported on 3/8/2017)     No current facility-administered medications on file prior to visit.     No Known Allergies    Social History     Occupational History     Not on file.     Social History Main Topics     Smoking status: Never Smoker     Smokeless tobacco: Not on file     Alcohol use No     Drug use: No     Sexual activity: Yes     Partners: Male       Family History   Problem Relation Age of Onset     Other Cancer Father        REVIEW OF SYSTEMS  General: negative for, night sweats, dizziness, fatigue  Resp: No shortness of breath and no cough  CV: negative for chest pain, syncope or near-syncope  GI: negative for nausea, vomiting and diarrhea  : negative for dysuria and hematuria  Musculoskeletal: as above  Neurologic: negative for syncope   Hematologic: negative for bleeding disorder    Physical Exam:  Vitals: Temp 97.7  F (36.5  C)  Ht 1.626 m (5' 4\")  Wt 103.4 kg (228 lb)  BMI 39.14 kg/m2  BMI= Body mass index is 39.14 kg/(m^2).  Constitutional: healthy, alert and no acute distress   Psychiatric: mentation appears normal and " affect normal/bright  NEURO: no focal deficits  SKIN: .well healed, no erythema, no incision breakdown and no drainage.  JOINT/EXTREMITIES: Wrist Exam: WRIST:  Inspection: swelling mild  Palpation: Tender: none  Range of Motion: normal  Strength: no deficits    GAIT: non-antalgic    Diagnostic Modalities:  None today.      Impression:   Chief Complaint   Patient presents with     Surgical Followup     External Fixator Placement Left Distal Radius post op #7dos 10/10/16    7 months out doing well    Plan:   Activity: Upper extremity:  no restrictions  Staples/Sutures out: Not applicable.  Pain controlled: Yes. Continue to use: Percocet  Immobilzation: support sleeve as needed  Physical Therapy: HEP  Compression  Return to clinic PRN, or sooner as needed for changes.    Re-x-ray on return: No    Yuli Berrios M.D.

## 2017-10-11 NOTE — PROGRESS NOTES
SUBJECTIVE:   CC: Lilian Middleton is an 47 year old woman who presents for preventive health visit.     Physical   Annual:     Getting at least 3 servings of Calcium per day::  NO    Bi-annual eye exam::  Yes    Dental care twice a year::  Yes    Sleep apnea or symptoms of sleep apnea::  Daytime drowsiness and Excessive snoring    Diet::  Regular (no restrictions)    Frequency of exercise::  2-3 days/week    Duration of exercise::  15-30 minutes    Taking medications regularly::  Not Applicable    Additional concerns today::  YES (Spot/scab on  right side of face )    Scab on side of face to the righ of right eye.  LMP: 10/13/17 - every month    Feeling down, had a bout of depression several years ago when her dad passed away. Still thinks a lot about him and feels sad at times. 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 Took a medication for depression at one point but unsure which one, thinks maybe generic for Zoloft. Was prescribed by [facility].    Today's PHQ-2 Score:   PHQ-2 ( 1999 Pfizer) 10/20/2017   Q1: Little interest or pleasure in doing things More than half the days   Q2: Feeling down, depressed or hopeless More than half the days   PHQ-2 Score 4       Abuse: Current or Past(Physical, Sexual or Emotional)- No  Do you feel safe in your environment - Yes    Social History   Substance Use Topics     Smoking status: Never Smoker     Smokeless tobacco: Never Used     Alcohol use No     The patient does not drink >3 drinks per day nor >7 drinks per week.    Reviewed orders with patient.  Reviewed health maintenance and updated orders accordingly - Yes  Labs reviewed in EPIC    Patient under age 50, mutual decision reflected in health maintenance.        Pertinent mammograms are reviewed under the imaging tab.  History of abnormal Pap smear: NO - age 30-65 PAP every 5 years with negative HPV co-testing recommended    Reviewed and updated as needed this visit by clinical  "staffTobacco  Allergies  Med Hx  Surg Hx  Fam Hx  Soc Hx        Reviewed and updated as needed this visit by Provider        History reviewed. No pertinent past medical history.   Past Surgical History:   Procedure Laterality Date     APPLY EXTERNAL FIXATOR UPPER EXTREMITY Left 10/10/2016    Procedure: APPLY EXTERNAL FIXATOR UPPER EXTREMITY;  Surgeon: Yuli Berrios MD;  Location: [facility] OR       ROS:  C: NEGATIVE for fever, chills, change in weight  I: NEGATIVE for worrisome rashes, moles or lesions  E: NEGATIVE for vision changes or irritation  ENT: NEGATIVE for ear, mouth and throat problems  R: NEGATIVE for significant cough or SOB  B: NEGATIVE for masses, tenderness or discharge  CV: NEGATIVE for chest pain, palpitations or peripheral edema  GI: NEGATIVE for nausea, abdominal pain, heartburn, or change in bowel habits  : NEGATIVE for unusual urinary or vaginal symptoms. Periods are regular.  M: NEGATIVE for significant arthralgias or myalgia  N: NEGATIVE for weakness, dizziness or paresthesias  P: NEGATIVE for changes in mood or affect     OBJECTIVE:   /70 (BP Location: Right arm, Patient Position: Sitting, Cuff Size: Adult Large)  Pulse 60  Temp 98  F (36.7  C) (Temporal)  Resp 14  Ht 5' 4\" (1.626 m)  Wt 229 lb 11.2 oz (104.2 kg)  LMP 10/13/2017 (Exact Date)  BMI 39.43 kg/m2  EXAM:  GENERAL: healthy, alert and no distress  EYES: Eyes grossly normal to inspection, PERRL and conjunctivae and sclerae normal  HENT: ear canals and TM's normal, nose and mouth without ulcers or lesions  NECK: no adenopathy, no asymmetry, masses, or scars and thyroid normal to palpation  RESP: lungs clear to auscultation - no rales, rhonchi or wheezes  BREAST: normal without masses, tenderness or nipple discharge and no palpable axillary masses or adenopathy  CV: regular rate and rhythm, normal S1 S2, no S3 or S4, no murmur, click or rub, no peripheral edema and peripheral pulses strong  ABDOMEN: soft, nontender, " no hepatosplenomegaly, no masses and bowel sounds normal   (female): normal female external genitalia, normal urethral meatus, vaginal mucosa pink, moist, well rugated, and normal cervix/adnexa/uterus without masses or discharge  MS: no gross musculoskeletal defects noted, no edema  SKIN: no suspicious lesions or rashes  NEURO: Normal strength and tone, mentation intact and speech normal  PSYCH: mentation appears normal, affect normal/bright    ASSESSMENT/PLAN:   1. Encounter for routine adult health examination without abnormal findings    2. Mild episode of recurrent major depressive disorder (H)  Patient would like to start something for depression which has been exacerbated by recent job loss. Patient is keeping a positive outlook on this as she was not happy at her job for the past several years. Will start citalopram and follow up in 6-8 weeks.  - citalopram (CELEXA) 20 MG tablet; Take 1/2 tablet (10 mg) for 1-2 weeks, then increase to 1 tablet orally daily  Dispense: 30 tablet; Refill: 0    3. Candidal intertrigo  Symptoms have improved with cream but return without use. Use consistently for complete improvement.  - ketoconazole (NIZORAL) 2 % cream; Apply topically 2 times daily  Dispense: 60 g; Refill: 3    4. Encounter for screening for cardiovascular disorders  - Lipid panel reflex to direct LDL    5. Screening for diabetes mellitus  - Lipid panel reflex to direct LDL  - Basic metabolic panel    6. Screening for malignant neoplasm of cervix  - Pap imaged thin layer screen with HPV - recommended age 30 - 65 years (select HPV order below)  - HPV High Risk Types DNA Cervical    7. Encounter for screening mammogram for breast cancer  - *MA Screening Digital Bilateral; Future    COUNSELING:  Reviewed preventive health counseling, as reflected in patient instructions       Regular exercise       Healthy diet/nutrition         reports that she has never smoked. She has never used smokeless tobacco.    Estimated  "body mass index is 39.43 kg/(m^2) as calculated from the following:    Height as of this encounter: 5' 4\" (1.626 m).    Weight as of this encounter: 229 lb 11.2 oz (104.2 kg).   Weight management plan: Discussed healthy diet and exercise guidelines and patient will follow up in 12 months in clinic to re-evaluate.    Counseling Resources:  ATP IV Guidelines  Pooled Cohorts Equation Calculator  Breast Cancer Risk Calculator  FRAX Risk Assessment  ICSI Preventive Guidelines  Dietary Guidelines for Americans, 2010  USDA's MyPlate  ASA Prophylaxis  Lung CA Screening    ZEN Chandler [facility]  "

## 2017-10-11 NOTE — PATIENT INSTRUCTIONS
Schedule mammogram.  Pap smear today.  Blood work today.  Refills of ketoconazole.  Try minocycline again for acne. Take benadryl if you have a reaction to this and stop medication.  Evi Ibarra, MENG-C    Preventive Health Recommendations  Female Ages 40 to 49    Yearly exam:     See your health care provider every year in order to  1. Review health changes.   2. Discuss preventive care.    3. Review your medicines if your doctor prescribed any.      Get a Pap test every three years (unless you have an abnormal result and your provider advises testing more often).      If you get Pap tests with HPV test, you only need to test every 5 years, unless you have an abnormal result. You do not need a Pap test if your uterus was removed (hysterectomy) and you have not had cancer.      You should be tested each year for STDs (sexually transmitted diseases), if you're at risk.       Ask your doctor if you should have a mammogram.      Have a colonoscopy (test for colon cancer) if someone in your family has had colon cancer or polyps before age 50.       Have a cholesterol test every 5 years.       Have a diabetes test (fasting glucose) after age 45. If you are at risk for diabetes, you should have this test every 3 years.    Shots: Get a flu shot each year. Get a tetanus shot every 10 years.     Nutrition:     Eat at least 5 servings of fruits and vegetables each day.    Eat whole-grain bread, whole-wheat pasta and brown rice instead of white grains and rice.    Talk to your provider about Calcium and Vitamin D.     Lifestyle    Exercise at least 150 minutes a week (an average of 30 minutes a day, 5 days a week). This will help you control your weight and prevent disease.    Limit alcohol to one drink per day.    No smoking.     Wear sunscreen to prevent skin cancer.    See your dentist every six months for an exam and cleaning.

## 2017-10-20 ENCOUNTER — OFFICE VISIT (OUTPATIENT)
Dept: FAMILY MEDICINE | Facility: OTHER | Age: 47
End: 2017-10-20
Payer: COMMERCIAL

## 2017-10-20 VITALS
WEIGHT: 229.7 LBS | DIASTOLIC BLOOD PRESSURE: 70 MMHG | HEART RATE: 60 BPM | HEIGHT: 64 IN | RESPIRATION RATE: 14 BRPM | SYSTOLIC BLOOD PRESSURE: 110 MMHG | TEMPERATURE: 98 F | BODY MASS INDEX: 39.21 KG/M2

## 2017-10-20 DIAGNOSIS — Z13.1 SCREENING FOR DIABETES MELLITUS: ICD-10-CM

## 2017-10-20 DIAGNOSIS — Z12.4 SCREENING FOR MALIGNANT NEOPLASM OF CERVIX: ICD-10-CM

## 2017-10-20 DIAGNOSIS — Z13.6 ENCOUNTER FOR SCREENING FOR CARDIOVASCULAR DISORDERS: ICD-10-CM

## 2017-10-20 DIAGNOSIS — B37.2 CANDIDAL INTERTRIGO: ICD-10-CM

## 2017-10-20 DIAGNOSIS — F33.0 MILD EPISODE OF RECURRENT MAJOR DEPRESSIVE DISORDER (H): ICD-10-CM

## 2017-10-20 DIAGNOSIS — Z12.31 ENCOUNTER FOR SCREENING MAMMOGRAM FOR BREAST CANCER: ICD-10-CM

## 2017-10-20 DIAGNOSIS — Z00.00 ENCOUNTER FOR ROUTINE ADULT HEALTH EXAMINATION WITHOUT ABNORMAL FINDINGS: Primary | ICD-10-CM

## 2017-10-20 LAB
ANION GAP SERPL CALCULATED.3IONS-SCNC: 6 MMOL/L (ref 3–14)
BUN SERPL-MCNC: 11 MG/DL (ref 7–30)
CALCIUM SERPL-MCNC: 8.7 MG/DL (ref 8.5–10.1)
CHLORIDE SERPL-SCNC: 105 MMOL/L (ref 94–109)
CHOLEST SERPL-MCNC: 225 MG/DL
CO2 SERPL-SCNC: 28 MMOL/L (ref 20–32)
CREAT SERPL-MCNC: 0.82 MG/DL (ref 0.52–1.04)
GFR SERPL CREATININE-BSD FRML MDRD: 75 ML/MIN/1.7M2
GLUCOSE SERPL-MCNC: 94 MG/DL (ref 70–99)
HDLC SERPL-MCNC: 47 MG/DL
LDLC SERPL CALC-MCNC: 149 MG/DL
NONHDLC SERPL-MCNC: 178 MG/DL
POTASSIUM SERPL-SCNC: 4.1 MMOL/L (ref 3.4–5.3)
SODIUM SERPL-SCNC: 139 MMOL/L (ref 133–144)
TRIGL SERPL-MCNC: 144 MG/DL

## 2017-10-20 PROCEDURE — 80048 BASIC METABOLIC PNL TOTAL CA: CPT | Performed by: STUDENT IN AN ORGANIZED HEALTH CARE EDUCATION/TRAINING PROGRAM

## 2017-10-20 PROCEDURE — 36415 COLL VENOUS BLD VENIPUNCTURE: CPT | Performed by: STUDENT IN AN ORGANIZED HEALTH CARE EDUCATION/TRAINING PROGRAM

## 2017-10-20 PROCEDURE — 87624 HPV HI-RISK TYP POOLED RSLT: CPT | Performed by: STUDENT IN AN ORGANIZED HEALTH CARE EDUCATION/TRAINING PROGRAM

## 2017-10-20 PROCEDURE — G0145 SCR C/V CYTO,THINLAYER,RESCR: HCPCS | Performed by: STUDENT IN AN ORGANIZED HEALTH CARE EDUCATION/TRAINING PROGRAM

## 2017-10-20 PROCEDURE — 80061 LIPID PANEL: CPT | Performed by: STUDENT IN AN ORGANIZED HEALTH CARE EDUCATION/TRAINING PROGRAM

## 2017-10-20 PROCEDURE — 99396 PREV VISIT EST AGE 40-64: CPT | Performed by: STUDENT IN AN ORGANIZED HEALTH CARE EDUCATION/TRAINING PROGRAM

## 2017-10-20 RX ORDER — KETOCONAZOLE 20 MG/G
CREAM TOPICAL 2 TIMES DAILY
Qty: 60 G | Refills: 3 | Status: SHIPPED | OUTPATIENT
Start: 2017-10-20 | End: 2018-12-03

## 2017-10-20 ASSESSMENT — PAIN SCALES - GENERAL: PAINLEVEL: NO PAIN (0)

## 2017-10-20 NOTE — NURSING NOTE
"Chief Complaint   Patient presents with     Physical     Panel Management     MyChart, Flu shot, Pap       Initial /70 (BP Location: Right arm, Patient Position: Sitting, Cuff Size: Adult Large)  Pulse 60  Temp 98  F (36.7  C) (Temporal)  Resp 14  Ht 5' 4\" (1.626 m)  Wt 229 lb 11.2 oz (104.2 kg)  LMP 10/13/2017 (Exact Date)  BMI 39.43 kg/m2 Estimated body mass index is 39.43 kg/(m^2) as calculated from the following:    Height as of this encounter: 5' 4\" (1.626 m).    Weight as of this encounter: 229 lb 11.2 oz (104.2 kg).  Medication Reconciliation: complete   Rossi Briggs CMA      "

## 2017-10-20 NOTE — LETTER
October 23, 2017      Lilian Middleton  [address] [place] [address]        Dear ,    We are writing to inform you of your test results.    Your cholesterol is elevated. This should improve with diet and exercise. Medication is not indicated at this time. We will recheck these in one year at her next physical.      Resulted Orders   Lipid panel reflex to direct LDL   Result Value Ref Range    Cholesterol 225 (H) <200 mg/dL      Comment:      Desirable:       <200 mg/dl    Triglycerides 144 <150 mg/dL    HDL Cholesterol 47 (L) >49 mg/dL    LDL Cholesterol Calculated 149 (H) <100 mg/dL      Comment:      Above desirable:  100-129 mg/dl  Borderline High:  130-159 mg/dL  High:             160-189 mg/dL  Very high:       >189 mg/dl      Non HDL Cholesterol 178 (H) <130 mg/dL      Comment:      Above Desirable:  130-159 mg/dl  Borderline high:  160-189 mg/dl  High:             190-219 mg/dl  Very high:       >219 mg/dl     Basic metabolic panel   Result Value Ref Range    Sodium 139 133 - 144 mmol/L    Potassium 4.1 3.4 - 5.3 mmol/L    Chloride 105 94 - 109 mmol/L    Carbon Dioxide 28 20 - 32 mmol/L    Anion Gap 6 3 - 14 mmol/L    Glucose 94 70 - 99 mg/dL    Urea Nitrogen 11 7 - 30 mg/dL    Creatinine 0.82 0.52 - 1.04 mg/dL    GFR Estimate 75 >60 mL/min/1.7m2      Comment:      Non  GFR Calc    GFR Estimate If Black >90 >60 mL/min/1.7m2      Comment:       GFR Calc    Calcium 8.7 8.5 - 10.1 mg/dL       If you have any questions or concerns, please call the clinic at the number listed above.       Sincerely,        Evi Ibarra, ZEN CNP

## 2017-10-20 NOTE — MR AVS SNAPSHOT
After Visit Summary   10/20/2017    Lilian Middleton    MRN: [id number]           Patient Information     Date Of Birth          1970        Visit Information        Provider Department      10/20/2017 7:20 AM Evi Ibarra APRN [facility]        Today's Diagnoses     Encounter for screening for cardiovascular disorders    -  1    Candidal intertrigo        Screening for diabetes mellitus        Screening for malignant neoplasm of cervix        Mild episode of recurrent major depressive disorder (H)        Encounter for screening mammogram for breast cancer          Care Instructions    Schedule mammogram.  Pap smear today.  Blood work today.  Refills of ketoconazole.  Try minocycline again for acne. Take benadryl if you have a reaction to this and stop medication.  Evi Ibarra, NP-C    Preventive Health Recommendations  Female Ages 40 to 49    Yearly exam:     See your health care provider every year in order to  1. Review health changes.   2. Discuss preventive care.    3. Review your medicines if your doctor prescribed any.      Get a Pap test every three years (unless you have an abnormal result and your provider advises testing more often).      If you get Pap tests with HPV test, you only need to test every 5 years, unless you have an abnormal result. You do not need a Pap test if your uterus was removed (hysterectomy) and you have not had cancer.      You should be tested each year for STDs (sexually transmitted diseases), if you're at risk.       Ask your doctor if you should have a mammogram.      Have a colonoscopy (test for colon cancer) if someone in your family has had colon cancer or polyps before age 50.       Have a cholesterol test every 5 years.       Have a diabetes test (fasting glucose) after age 45. If you are at risk for diabetes, you should have this test every 3 years.    Shots: Get a flu shot each year. Get a tetanus shot every 10  "years.     Nutrition:     Eat at least 5 servings of fruits and vegetables each day.    Eat whole-grain bread, whole-wheat pasta and brown rice instead of white grains and rice.    Talk to your provider about Calcium and Vitamin D.     Lifestyle    Exercise at least 150 minutes a week (an average of 30 minutes a day, 5 days a week). This will help you control your weight and prevent disease.    Limit alcohol to one drink per day.    No smoking.     Wear sunscreen to prevent skin cancer.    See your dentist every six months for an exam and cleaning.          Follow-ups after your visit        Future tests that were ordered for you today     Open Future Orders        Priority Expected Expires Ordered    *MA Screening Digital Bilateral Routine  10/20/2018 10/20/2017            Who to contact     If you have questions or need follow up information about today's clinic visit or your schedule please contact [facility] directly at [phone].  Normal or non-critical lab and imaging results will be communicated to you by MyChart, letter or phone within 4 business days after the clinic has received the results. If you do not hear from us within 7 days, please contact the clinic through [employer] or phone. If you have a critical or abnormal lab result, we will notify you by phone as soon as possible.  Submit refill requests through PlayEnable or call your pharmacy and they will forward the refill request to us. Please allow 3 business days for your refill to be completed.          Additional Information About Your Visit        [employer] Information     PlayEnable lets you send messages to your doctor, view your test results, renew your prescriptions, schedule appointments and more. To sign up, go to [email or web address] . Click on \"Log in\" on the left side of the screen, which will take you to the Welcome page. Then click on \"Sign up Now\" on the right side of the page.     You will be asked to enter the access " "code listed below, as well as some personal information. Please follow the directions to create your username and password.     Your access code is: [email or web address]  Expires: 2018  8:14 AM     Your access code will  in 90 days. If you need help or a new code, please call your [facility] clinic or [phone].        Care EveryWhere ID     This is your Care EveryWhere ID. This could be used by other organizations to access your [facility] medical records  [id number]        Your Vitals Were     Pulse Temperature Respirations Height Last Period BMI (Body Mass Index)    60 98  F (36.7  C) (Temporal) 14 5' 4\" (1.626 m) 10/13/2017 (Exact Date) 39.43 kg/m2       Blood Pressure from Last 3 Encounters:   10/20/17 110/70   10/10/16 143/64   10/05/16 116/78    Weight from Last 3 Encounters:   10/20/17 229 lb 11.2 oz (104.2 kg)   17 228 lb (103.4 kg)   17 222 lb (100.7 kg)              We Performed the Following     Basic metabolic panel     HPV High Risk Types DNA Cervical     Lipid panel reflex to direct LDL     Pap imaged thin layer screen with HPV - recommended age 30 - 65 years (select HPV order below)          Where to get your medicines      These medications were sent to [facility] [place] - [address] [place] [address]  [address] [place] [address], [place] [address]     Phone:  [phone]     ketoconazole 2 % cream          Primary Care Provider Office Phone # Fax #    [facility] [phone]       [address] [place] [address] [address]  [facility] [address]        Equal Access to Services     RANDELL DARDEN AH: Mary Botello, wanguyễnda luqadaha, qaybta kaalmada naseem, markell sanford. So [facility] [phone].    ATENCIÓN: Si habla español, tiene a drew disposición servicios gratuitos de asistencia lingüística. Llame al [phone].    We comply with applicable federal civil rights laws and [place] laws. " We do not discriminate on the basis of race, color, national origin, age, disability, sex, sexual orientation, or gender identity.            Thank you!     Thank you for choosing [facility]  for your care. Our goal is always to provide you with excellent care. Hearing back from our patients is one way we can continue to improve our services. Please take a few minutes to complete the written survey that you may receive in the mail after your visit with us. Thank you!             Your Updated Medication List - Protect others around you: Learn how to safely use, store and throw away your medicines at www.disposemymeds.org.          This list is accurate as of: 10/20/17  8:14 AM.  Always use your most recent med list.                   Brand Name Dispense Instructions for use Diagnosis    ketoconazole 2 % cream    NIZORAL    60 g    Apply topically 2 times daily    Candidal intertrigo       TYLENOL PO      Take 500 mg by mouth as needed for mild pain or fever Reported on 3/8/2017

## 2017-10-20 NOTE — LETTER
October 26, 2017    Lilian Middleton  [address] [place] [address]    Dear Lilian,  We are happy to inform you that your PAP smear result from 10/20/17 is normal.  We are now able to do a follow up test on PAP smears. The DNA test is for HPV (Human Papilloma Virus). Cervical cancer is closely linked with certain types of HPV. Your result showed no evidence of high risk HPV.  Therefore we recommend you return in 5 years for your next pap smear and HPV test.  You will still need to return to the clinic every year for an annual exam and other preventive tests.  Please contact the clinic at [phone] with any questions.  Sincerely,    Evi Ibarra, ZEN CNP/rlm

## 2017-10-23 ENCOUNTER — TELEPHONE (OUTPATIENT)
Dept: FAMILY MEDICINE | Facility: OTHER | Age: 47
End: 2017-10-23

## 2017-10-23 RX ORDER — CITALOPRAM HYDROBROMIDE 20 MG/1
TABLET ORAL
Qty: 30 TABLET | Refills: 0 | Status: SHIPPED | OUTPATIENT
Start: 2017-10-23 | End: 2017-12-08

## 2017-10-23 NOTE — TELEPHONE ENCOUNTER
Results given to patient anc copy mailed  Closing encounter  Henny Pedro RT (R)      Please call patient and let her know her cholesterol is elevated. This should improve with diet and exercise. Medication is not indicated at this time. We will recheck these in one year at her next physical.  Thanks,  Evi Ibarra, CNP

## 2017-10-24 LAB
COPATH REPORT: NORMAL
PAP: NORMAL

## 2017-10-24 NOTE — TELEPHONE ENCOUNTER
"Spoke to patient and gave her message below. Patient asks \"Is citalopram a milder form of Zoloft?\"    Will huddle with EM and contact patient.  "

## 2017-10-24 NOTE — TELEPHONE ENCOUNTER
Huddled with EM, EM would like patient to take citalopram 10mg instead of original Rx for 20mg take half tablet and then increase to 1 tablet. Patient is to follow up in clinic in 4-6 weeks. Called pharmacy and had pharmacist change prescription.    Spoke to patient and informed her. Patient expressed understanding.

## 2017-10-24 NOTE — TELEPHONE ENCOUNTER
Please call patient to let her know we found out she had been on Zoloft. I recommend starting citalopram as we discussed. I sent the prescription to the pharmacy.  ELLIOTT TylerC

## 2017-10-25 ENCOUNTER — RADIANT APPOINTMENT (OUTPATIENT)
Dept: MAMMOGRAPHY | Facility: OTHER | Age: 47
End: 2017-10-25
Attending: STUDENT IN AN ORGANIZED HEALTH CARE EDUCATION/TRAINING PROGRAM
Payer: COMMERCIAL

## 2017-10-25 DIAGNOSIS — Z12.31 ENCOUNTER FOR SCREENING MAMMOGRAM FOR BREAST CANCER: ICD-10-CM

## 2017-10-25 PROCEDURE — G0202 SCR MAMMO BI INCL CAD: HCPCS | Mod: TC

## 2017-10-26 LAB
FINAL DIAGNOSIS: NORMAL
HPV HR 12 DNA CVX QL NAA+PROBE: NEGATIVE
HPV16 DNA SPEC QL NAA+PROBE: NEGATIVE
HPV18 DNA SPEC QL NAA+PROBE: NEGATIVE
SPECIMEN DESCRIPTION: NORMAL

## 2017-12-04 NOTE — PROGRESS NOTES
"  SUBJECTIVE:                                                    Lilian Middleton is a 47 year old female who presents to clinic today for the following health issues:      History of Present Illness     Depression & Anxiety Follow-up:     Depression/Anxiety:  Depression & Anxiety    Status since last visit::  Improved    Other associated symptoms of depression and anxiety::  YES    Significant life event::  YES    Current substance use::  Prescription Drugs    Diet:  Regular (no restrictions)  Frequency of exercise:  2-3 days/week  Duration of exercise:  15-30 minutes  Taking medications regularly:  Yes  Medication side effects:  Not applicable  Additional concerns today:  No    Ran out of Celexa and hasn't taken it for a week. Patient states pharmacy gave her 10mg tabs instead of 20mg tabs.  Patient ran out of medication a week and half ago. She notes that she was having difficulty sleeping at night taking the medication. She was taking the medication at night and switched it to the morning for 2 doses before she ran out so she is unsure if taking it in the morning with the help improve her sleep. She states she felt \"like a zombie\" during the month of November while she was on the medication but she feels this was due to changes in her sleep. For the past week and half while she's been off the medication she has felt a little more emotional so she thinks the medication did help with that aspect of her depression. She has been helping out at the [employer] she attends and getting her house cleaned which has helped improve her mood she was unable to focus on these things while she was working at the job that she lost earlier this fall. She had not been happy at this job so she feels that the loss of the job was somewhat of a blessing in disquise.    PHQ-9 SCORE 10/20/2017 12/8/2017   Total Score MyChart 14 (Moderate depression) 10 (Moderate depression)   Total Score - 10       Answers for HPI/ROS submitted by the " "patient on 12/8/2017   PHQ-2 Score: 2  If you checked off any problems, how difficult have these problems made it for you to do your work, take care of things at home, or get along with other people?: Somewhat difficult  PHQ9 TOTAL SCORE: 10      Problem list and histories reviewed & adjusted, as indicated.  Additional history: as documented    Labs reviewed in EPIC    ROS:  Constitutional, HEENT, cardiovascular, pulmonary, GI, , musculoskeletal, neuro, skin, endocrine and psych systems are negative, except as otherwise noted.      OBJECTIVE:   /82 (BP Location: Right arm, Patient Position: Sitting, Cuff Size: Adult Regular)  Pulse 70  Temp 98.2  F (36.8  C) (Temporal)  Resp 20  Ht 5' 4\" (1.626 m)  Wt 229 lb 8 oz (104.1 kg)  BMI 39.39 kg/m2  Body mass index is 39.39 kg/(m^2).  GENERAL: healthy, alert and no distress  Re: Henny RESP: lungs clear to auscultation - no rales, rhonchi or wheezes  CV: regular rate and rhythm, normal S1 S2, no S3 or S4, no murmur, click or rub  MS: no gross musculoskeletal defects noted, no edema  SKIN: no suspicious lesions or rashes  NEURO: Normal strength and tone, mentation intact and speech normal  PSYCH: mentation appears normal, affect normal/bright, judgment and insight intact, well groomed    Diagnostic Test Results:  none     ASSESSMENT/PLAN:     1. Moderate episode of recurrent major depressive disorder (H)  We will continue the citalopram at 10 mg and patient will start taking it in the morning to see if this helps with sleep. If she continues to take the medication the morning and feels \"like a zombie \"then we will order a different SSRI. She may call in and update if she feels the medication is not working and I will send a prescription for fluoxetine 10 mg daily.  - citalopram (CELEXA) 10 MG tablet; Take 1 tablet (10 mg) by mouth daily  Dispense: 30 tablet; Refill: 3    [facility]  "

## 2017-12-08 ENCOUNTER — OFFICE VISIT (OUTPATIENT)
Dept: FAMILY MEDICINE | Facility: OTHER | Age: 47
End: 2017-12-08
Payer: COMMERCIAL

## 2017-12-08 VITALS
DIASTOLIC BLOOD PRESSURE: 82 MMHG | HEIGHT: 64 IN | BODY MASS INDEX: 39.18 KG/M2 | WEIGHT: 229.5 LBS | RESPIRATION RATE: 20 BRPM | SYSTOLIC BLOOD PRESSURE: 116 MMHG | HEART RATE: 70 BPM | TEMPERATURE: 98.2 F

## 2017-12-08 DIAGNOSIS — F33.1 MODERATE EPISODE OF RECURRENT MAJOR DEPRESSIVE DISORDER (H): Primary | ICD-10-CM

## 2017-12-08 PROCEDURE — 99213 OFFICE O/P EST LOW 20 MIN: CPT | Performed by: STUDENT IN AN ORGANIZED HEALTH CARE EDUCATION/TRAINING PROGRAM

## 2017-12-08 RX ORDER — CITALOPRAM HYDROBROMIDE 10 MG/1
10 TABLET ORAL DAILY
Qty: 30 TABLET | Refills: 3 | Status: SHIPPED | OUTPATIENT
Start: 2017-12-08 | End: 2018-04-09

## 2017-12-08 ASSESSMENT — PATIENT HEALTH QUESTIONNAIRE - PHQ9
10. IF YOU CHECKED OFF ANY PROBLEMS, HOW DIFFICULT HAVE THESE PROBLEMS MADE IT FOR YOU TO DO YOUR WORK, TAKE CARE OF THINGS AT HOME, OR GET ALONG WITH OTHER PEOPLE: SOMEWHAT DIFFICULT
SUM OF ALL RESPONSES TO PHQ QUESTIONS 1-9: 10
SUM OF ALL RESPONSES TO PHQ QUESTIONS 1-9: 10

## 2017-12-08 ASSESSMENT — PAIN SCALES - GENERAL: PAINLEVEL: NO PAIN (0)

## 2017-12-08 NOTE — LETTER
My Depression Action Plan  Name: Lilian Middleton   Date of Birth 1970  Date: 12/4/2017    My doctor: Evi Ibarra   My clinic: [facility]  [id number] [address] [phone]          GREEN    ZONE   Good Control    What it looks like:     Things are going generally well. You have normal up s and down s. You may even feel depressed from time to time, but bad moods usually last less than a day.   What you need to do:  1. Continue to care for yourself (see self care plan)  2. Check your depression survival kit and update it as needed  3. Follow your physician s recommendations including any medication.  4. Do not stop taking medication unless you consult with your physician first.           YELLOW         ZONE Getting Worse    What it looks like:     Depression is starting to interfere with your life.     It may be hard to get out of bed; you may be starting to isolate yourself from others.    Symptoms of depression are starting to last most all day and this has happened for several days.     You may have suicidal thoughts but they are not constant.   What you need to do:     1. Call your care team, your response to treatment will improve if you keep your care team informed of your progress. Yellow periods are signs an adjustment may need to be made.     2. Continue your self-care, even if you have to fake it!    3. Talk to someone in your support network    4. Open up your depression survival kit           RED    ZONE Medical Alert - Get Help    What it looks like:     Depression is seriously interfering with your life.     You may experience these or other symptoms: You can t get out of bed most days, can t work or engage in other necessary activities, you have trouble taking care of basic hygiene, or basic responsibilities, thoughts of suicide or death that will not go away, self-injurious behavior.     What you need to do:  1. Call your care  team and request a same-day appointment. If they are not available (weekends or after hours) call your local crisis line, emergency room or 911.      Electronically signed by: Rossi Briggs, December 4, 2017    Depression Self Care Plan / Survival Kit    Self-Care for Depression  Here s the deal. Your body and mind are really not as separate as most people think.  What you do and think affects how you feel and how you feel influences what you do and think. This means if you do things that people who feel good do, it will help you feel better.  Sometimes this is all it takes.  There is also a place for medication and therapy depending on how severe your depression is, so be sure to consult with your medical provider and/ or Behavioral Health Consultant if your symptoms are worsening or not improving.     In order to better manage my stress, I will:    Exercise  Get some form of exercise, every day. This will help reduce pain and release endorphins, the  feel good  chemicals in your brain. This is almost as good as taking antidepressants!  This is not the same as joining a gym and then never going! (they count on that by the way ) It can be as simple as just going for a walk or doing some gardening, anything that will get you moving.      Hygiene   Maintain good hygiene (Get out of bed in the morning, Make your bed, Brush your teeth, Take a shower, and Get dressed like you were going to work, even if you are unemployed).  If your clothes don't fit try to get ones that do.    Diet  I will strive to eat foods that are good for me, drink plenty of water, and avoid excessive sugar, caffeine, alcohol, and other mood-altering substances.  Some foods that are helpful in depression are: complex carbohydrates, B vitamins, flaxseed, fish or fish oil, fresh fruits and vegetables.    Psychotherapy  I agree to participate in Individual Therapy (if recommended).    Medication  If prescribed medications, I agree to take them.  Missing  doses can result in serious side effects.  I understand that drinking alcohol, or other illicit drug use, may cause potential side effects.  I will not stop my medication abruptly without first discussing it with my provider.    Staying Connected With Others  I will stay in touch with my friends, family members, and my primary care provider/team.    Use your imagination  Be creative.  We all have a creative side; it doesn t matter if it s oil painting, sand castles, or mud pies! This will also kick up the endorphins.    Witness Beauty  (AKA stop and smell the roses) Take a look outside, even in mid-winter. Notice colors, textures. Watch the squirrels and birds.     Service to others  Be of service to others.  There is always someone else in need.  By helping others we can  get out of ourselves  and remember the really important things.  This also provides opportunities for practicing all the other parts of the program.    Humor  Laugh and be silly!  Adjust your TV habits for less news and crime-drama and more comedy.    Control your stress  Try breathing deep, massage therapy, biofeedback, and meditation. Find time to relax each day.     My support system    Clinic Contact:  Phone number:    Contact 1:  Phone number:    Contact 2:  Phone number:    Nondenominational/:  Phone number:    Therapist:  Phone number:    Local crisis center:    Phone number:    Other community support:  Phone number:

## 2017-12-09 ASSESSMENT — PATIENT HEALTH QUESTIONNAIRE - PHQ9: SUM OF ALL RESPONSES TO PHQ QUESTIONS 1-9: 10

## 2018-04-03 ENCOUNTER — TELEPHONE (OUTPATIENT)
Dept: FAMILY MEDICINE | Facility: OTHER | Age: 48
End: 2018-04-03

## 2018-04-03 NOTE — TELEPHONE ENCOUNTER
Patient completed a PHQ-9 with me over the phone. Her score was 16 so I scheduled her for an office visit on 4-9/2018. No further questions. Closing encounter.     Xochilt Acosta MA

## 2018-04-03 NOTE — PROGRESS NOTES
SUBJECTIVE:   Lilian Middleton is a 47 year old female who presents to clinic today for the following health issues:      History of Present Illness     Depression & Anxiety Follow-up:     Depression/Anxiety:  Depression & Anxiety    Status since last visit::  Stable    Other associated symptoms of depression and anxiety::  None    Significant life event::  YES    Current substance use::  Prescription Drugs       Today's PHQ-9         PHQ-9 Total Score:     (P) 16   PHQ-9 Q9 Suicidal ideation:   (P) Not at all   Thoughts of suicide or self harm:      Self-harm Plan:        Self-harm Action:          Safety concerns for self or others:            Diet:  Regular (no restrictions)  Frequency of exercise:  2-3 days/week  Duration of exercise:  15-30 minutes    Answers for HPI/ROS submitted by the patient on 2018   PHQ-2 Score: 4  If you checked off any problems, how difficult have these problems made it for you to do your work, take care of things at home, or get along with other people?: Somewhat difficult  PHQ9 TOTAL SCORE: 16    Birth control - patient has had improved libido in the recent past and is concerned about pregnancy prevention. She has never taken birth control in the past and is wondering about her options. She is not always good about taking a daily medication so would like to avoid this. She is not sure what her insurance covers    Ringing in ears - has noticed this more over the past couple of weeks particularly when she is lying in bed at night in the quiet. Otherwise she will notice it periodically throughout the day. She has had some increased nasal and sinus congestion over the past couple weeks and has a history of allergic rhinitis. She does not take a daily allergy medication.    Asthma she was diagnosed with exercise-induced asthma when she was in the  and requires albuterol as needed. She has not refilled her albuterol in over a year and believes that . She would like a  "refill of this. She currently only gets short of breath with exertion such as when she was shoveling a couple weeks ago.      Problem list and histories reviewed & adjusted, as indicated.  Additional history: as documented    BP Readings from Last 3 Encounters:   04/09/18 (!) 136/94   12/08/17 116/82   10/20/17 110/70    Wt Readings from Last 3 Encounters:   04/09/18 233 lb 8 oz (105.9 kg)   12/08/17 229 lb 8 oz (104.1 kg)   10/20/17 229 lb 11.2 oz (104.2 kg)                  Labs reviewed in EPIC    ROS:  Constitutional, HEENT, cardiovascular, pulmonary, GI, , musculoskeletal, neuro, skin, endocrine and psych systems are negative, except as otherwise noted.    OBJECTIVE:     BP (!) 136/94 (BP Location: Left arm, Patient Position: Sitting, Cuff Size: Adult Large)  Pulse 76  Temp 98.2  F (36.8  C) (Temporal)  Resp 16  Ht 5' 4.17\" (1.63 m)  Wt 233 lb 8 oz (105.9 kg)  BMI 39.86 kg/m2  Body mass index is 39.86 kg/(m^2).  GENERAL: healthy, alert and no distress  EYES: Eyes grossly normal to inspection, PERRL and conjunctivae and sclerae normal  HENT: ear canals and TM's normal, nose and mouth without ulcers or lesions  NECK: no adenopathy, no asymmetry, masses, or scars and thyroid normal to palpation  RESP: lungs clear to auscultation - no rales, rhonchi or wheezes  CV: regular rate and rhythm, normal S1 S2, no S3 or S4, no murmur, click or rub, no peripheral edema and peripheral pulses strong  ABDOMEN: soft, nontender, no hepatosplenomegaly, no masses and bowel sounds normal  MS: no gross musculoskeletal defects noted, no edema  SKIN: no suspicious lesions or rashes  NEURO: Normal strength and tone, mentation intact and speech normal  PSYCH: mentation appears normal, affect normal/bright    Diagnostic Test Results:  none     ASSESSMENT/PLAN:     1. Moderate episode of recurrent major depressive disorder (H)  Stable. Continue current medication and dose. Follow-up in 6 months.  - citalopram (CELEXA) 10 MG " tablet; Take 1 tablet (10 mg) by mouth daily  Dispense: 90 tablet; Refill: 1    2. Tinnitus, bilateral  4. Chronic seasonal allergic rhinitis, unspecified trigger  Discussed that this could be either age-related and exacerbated by seasonal allergic rhinitis and current nasal and sinus congestion with mild fluid behind both tympanic membranes. Recommended she continue to monitor symptoms and return to clinic if symptoms worsen or persist.    3. Exercise-induced asthma  Stable. ACT 20. Refill of albuterol sent.  - albuterol (PROAIR HFA/PROVENTIL HFA/VENTOLIN HFA) 108 (90 BASE) MCG/ACT Inhaler; Inhale 2 puffs into the lungs every 6 hours as needed for shortness of breath / dyspnea or wheezing  Dispense: 1 Inhaler; Refill: 5    5. Encounter for initial prescription of vaginal ring hormonal contraceptive  Discussed options for pregnancy prevention and patient is most interested in tubal ligation. She will check with her insurance to see if this is covered and if so scheduled with gynecology for consult. In the meantime we discussed other options to protect against pregnancy and she will try the NuvaRing. Discussed use and potential side effects. Call or follow-up if any questions or concerns.  - etonogestrel-ethinyl estradiol (NUVARING) 0.12-0.015 MG/24HR vaginal ring; Insert 1 ring vaginally every 21 days then remove for 1 week then repeat with new ring.  Dispense: 1 each; Refill: 11    Patient Instructions   Call insurance to find out if tubal ligation (tubes tied) is covered. Otherwise options are IUD or Nexplanon (felice implant in arm).  Dr. Ulrich - gynecologist [place] office. Call [phone]  [place] - Dr. Dennis Aviles or Dr. Morris Shepard - [phone]  Evi Ibarra, NP-C        Ethinyl Estradiol; Etonogestrel vaginal ring  Brand Name: NuvaRing  What is this medicine?  ETHINYL ESTRADIOL; ETONOGESTREL (ETH in il es tra DYE ole; et oh charline ERENDIRA trel) vaginal ring is a flexible, vaginal ring used as a  contraceptive (birth control method). This medicine combines two types of female hormones, an estrogen and a progestin. This ring is used to prevent ovulation and pregnancy. Each ring is effective for one month.  How should I use this medicine?  Insert the ring into your vagina as directed. Follow the directions on the prescription label. The ring will remain place for 3 weeks and is then removed for a 1-week break. A new ring is inserted 1 week after the last ring was removed, on the same day of the week. Check often to make sure the ring is still in place, especially before and after sexual intercourse. If the ring was out of the vagina for an unknown amount of time, you may not be protected from pregnancy. Perform a pregnancy test and call your doctor. Do not use more often than directed.  A patient package insert for the product will be given with each prescription and refill. Read this sheet carefully each time. The sheet may change frequently.  Contact your pediatrician regarding the use of this medicine in children. Special care may be needed. This medicine has been used in female children who have started having menstrual periods.  What side effects may I notice from receiving this medicine?  Side effects that you should report to your doctor or health care professional as soon as possible:    breast tissue changes or discharge    changes in vaginal bleeding during your period or between your periods    chest pain    coughing up blood    dizziness or fainting spells    headaches or migraines    leg, arm or groin pain    severe or sudden headaches    stomach pain (severe)    sudden shortness of breath    sudden loss of coordination, especially on one side of the body    speech problems    symptoms of vaginal infection like itching, irritation or unusual discharge    tenderness in the upper abdomen    vomiting    weakness or numbness in the arms or legs, especially on one side of the body    yellowing of the  eyes or skin  Side effects that usually do not require medical attention (report to your doctor or health care professional if they continue or are bothersome):    breakthrough bleeding and spotting that continues beyond the 3 initial cycles of pills    breast tenderness    mood changes, anxiety, depression, frustration, anger, or emotional outbursts    increased sensitivity to sun or ultraviolet light    nausea    skin rash, acne, or brown spots on the skin    weight gain (slight)  What may interact with this medicine?    acetaminophen    antibiotics or medicines for infections, especially rifampin, rifabutin, rifapentine, and griseofulvin, and possibly penicillins or tetracyclines    aprepitant    ascorbic acid (vitamin C)    atorvastatin    barbiturate medicines, such as phenobarbital    bosentan    carbamazepine    caffeine    clofibrate    cyclosporine    dantrolene    doxercalciferol    felbamate    grapefruit juice    hydrocortisone    medicines for anxiety or sleeping problems, such as diazepam or temazepam    medicines for diabetes, including pioglitazone    modafinil    mycophenolate    nefazodone    oxcarbazepine    phenytoin    prednisolone    ritonavir or other medicines for HIV infection or AIDS    rosuvastatin    selegiline    soy isoflavones supplements    Eli's wort    tamoxifen or raloxifene    theophylline    thyroid hormones    topiramate    warfarin  What if I miss a dose?  You will need to replace your vaginal ring once a month as directed. If the ring should slip out, or if you leave it in longer or shorter than you should, contact your health care professional for advice.  Where should I keep my medicine?  Keep out of the reach of children.  Store at room temperature between 15 and 30 degrees C (59 and 86 degrees F) for up to 4 months. The product will  after 4 months. Protect from light. Throw away any unused medicine after the expiration date.  What should I tell my health care  provider before I take this medicine?  They need to know if you have or ever had any of these conditions:    abnormal vaginal bleeding    blood vessel disease or blood clots    breast, cervical, endometrial, ovarian, liver, or uterine cancer    diabetes    gallbladder disease    heart disease or recent heart attack    high blood pressure    high cholesterol    kidney disease    liver disease    migraine headaches    stroke    systemic lupus erythematosus (SLE)    tobacco smoker    an unusual or allergic reaction to estrogens, progestins, other medicines, foods, dyes, or preservatives    pregnant or trying to get pregnant    breast-feeding  What should I watch for while using this medicine?  Visit your doctor or health care professional for regular checks on your progress. You will need a regular breast and pelvic exam and Pap smear while on this medicine.  Use an additional method of contraception during the first cycle that you use this ring. Do not use a diaphragm or female condom, as the ring can interfere with these birth control methods and their proper placement.  If you have any reason to think you are pregnant, stop using this medicine right away and contact your doctor or health care professional.  If you are using this medicine for hormone related problems, it may take several cycles of use to see improvement in your condition.  Smoking increases the risk of getting a blood clot or having a stroke while you are using hormonal birth control, especially if you are more than 35 years old. You are strongly advised not to smoke.  This medicine can make your body retain fluid, making your fingers, hands, or ankles swell. Your blood pressure can go up. Contact your doctor or health care professional if you feel you are retaining fluid.  This medicine can make you more sensitive to the sun. Keep out of the sun. If you cannot avoid being in the sun, wear protective clothing and use sunscreen. Do not use sun lamps  or tanning beds/booths.  If you wear contact lenses and notice visual changes, or if the lenses begin to feel uncomfortable, consult your eye care specialist.  In some women, tenderness, swelling, or minor bleeding of the gums may occur. Notify your dentist if this happens. Brushing and flossing your teeth regularly may help limit this. See your dentist regularly and inform your dentist of the medicines you are taking.  If you are going to have elective surgery, you may need to stop using this medicine before the surgery. Consult your health care professional for advice.  This medicine does not protect you against HIV infection (AIDS) or any other sexually transmitted diseases.  NOTE:This sheet is a summary. It may not cover all possible information. If you have questions about this medicine, talk to your doctor, pharmacist, or health care provider. Copyright  2017 [employer] [facility]

## 2018-04-03 NOTE — TELEPHONE ENCOUNTER
Summary:    Patient is due/failing the following:   PHQ9    Action needed:   Patient needs to do PHQ9.    Type of outreach:    Phone, left message for patient to call back.     Questions for provider review:    None                                                                                                                                    Kasia Smith     Chart routed to Care Team .        Panel Management Review      Patient has the following on her problem list:     Depression / Dysthymia review    Measure:  Needs PHQ-9 score of 4 or less during index window.  Administer PHQ-9 and if score is 5 or more, send encounter to provider for next steps.      PHQ-9 SCORE 10/20/2017 12/8/2017   Total Score MyChart 14 (Moderate depression) 10 (Moderate depression)   Total Score - 10       If PHQ-9 recheck is 5 or more, route to provider for next steps.    Patient is due for:  PHQ9      Composite cancer screening  Chart review shows that this patient is due/due soon for the following None

## 2018-04-04 ASSESSMENT — PATIENT HEALTH QUESTIONNAIRE - PHQ9: SUM OF ALL RESPONSES TO PHQ QUESTIONS 1-9: 16

## 2018-04-09 ENCOUNTER — OFFICE VISIT (OUTPATIENT)
Dept: FAMILY MEDICINE | Facility: OTHER | Age: 48
End: 2018-04-09
Payer: COMMERCIAL

## 2018-04-09 VITALS
TEMPERATURE: 98.2 F | DIASTOLIC BLOOD PRESSURE: 94 MMHG | WEIGHT: 233.5 LBS | SYSTOLIC BLOOD PRESSURE: 136 MMHG | BODY MASS INDEX: 39.86 KG/M2 | HEART RATE: 76 BPM | RESPIRATION RATE: 16 BRPM | HEIGHT: 64 IN

## 2018-04-09 DIAGNOSIS — J30.2 CHRONIC SEASONAL ALLERGIC RHINITIS, UNSPECIFIED TRIGGER: ICD-10-CM

## 2018-04-09 DIAGNOSIS — Z30.015 ENCOUNTER FOR INITIAL PRESCRIPTION OF VAGINAL RING HORMONAL CONTRACEPTIVE: ICD-10-CM

## 2018-04-09 DIAGNOSIS — F33.1 MODERATE EPISODE OF RECURRENT MAJOR DEPRESSIVE DISORDER (H): Primary | ICD-10-CM

## 2018-04-09 DIAGNOSIS — H93.13 TINNITUS, BILATERAL: ICD-10-CM

## 2018-04-09 DIAGNOSIS — J45.990 EXERCISE-INDUCED ASTHMA: ICD-10-CM

## 2018-04-09 PROBLEM — J45.20 MILD INTERMITTENT ASTHMA WITHOUT COMPLICATION: Status: RESOLVED | Noted: 2018-04-09 | Resolved: 2018-04-09

## 2018-04-09 PROBLEM — J45.20 MILD INTERMITTENT ASTHMA WITHOUT COMPLICATION: Status: ACTIVE | Noted: 2018-04-09

## 2018-04-09 PROCEDURE — 99214 OFFICE O/P EST MOD 30 MIN: CPT | Performed by: STUDENT IN AN ORGANIZED HEALTH CARE EDUCATION/TRAINING PROGRAM

## 2018-04-09 RX ORDER — ALBUTEROL SULFATE 90 UG/1
2 AEROSOL, METERED RESPIRATORY (INHALATION) EVERY 6 HOURS PRN
Qty: 1 INHALER | Refills: 5 | Status: SHIPPED | OUTPATIENT
Start: 2018-04-09

## 2018-04-09 RX ORDER — CITALOPRAM HYDROBROMIDE 10 MG/1
10 TABLET ORAL DAILY
Qty: 90 TABLET | Refills: 1 | Status: SHIPPED | OUTPATIENT
Start: 2018-04-09 | End: 2018-12-03

## 2018-04-09 RX ORDER — ETONOGESTREL AND ETHINYL ESTRADIOL VAGINAL RING .015; .12 MG/D; MG/D
RING VAGINAL
Qty: 1 EACH | Refills: 11 | Status: SHIPPED | OUTPATIENT
Start: 2018-04-09 | End: 2018-12-03

## 2018-04-09 ASSESSMENT — PATIENT HEALTH QUESTIONNAIRE - PHQ9
SUM OF ALL RESPONSES TO PHQ QUESTIONS 1-9: 16
10. IF YOU CHECKED OFF ANY PROBLEMS, HOW DIFFICULT HAVE THESE PROBLEMS MADE IT FOR YOU TO DO YOUR WORK, TAKE CARE OF THINGS AT HOME, OR GET ALONG WITH OTHER PEOPLE: SOMEWHAT DIFFICULT
SUM OF ALL RESPONSES TO PHQ QUESTIONS 1-9: 16

## 2018-04-09 NOTE — PATIENT INSTRUCTIONS
Call insurance to find out if tubal ligation (tubes tied) is covered. Otherwise options are IUD or Nexplanon (felice implant in arm).  Dr. Ulrich - gynecologist [place] office. Call [phone]  [place] - Dr. Dennis Aviles or Dr. Morris Shepard - [phone]  TONYA Tyler        Ethinyl Estradiol; Etonogestrel vaginal ring  Brand Name: NuvaRing  What is this medicine?  ETHINYL ESTRADIOL; ETONOGESTREL (ETH in il es tra DYE ole; et oh charline ERENDIRA trel) vaginal ring is a flexible, vaginal ring used as a contraceptive (birth control method). This medicine combines two types of female hormones, an estrogen and a progestin. This ring is used to prevent ovulation and pregnancy. Each ring is effective for one month.  How should I use this medicine?  Insert the ring into your vagina as directed. Follow the directions on the prescription label. The ring will remain place for 3 weeks and is then removed for a 1-week break. A new ring is inserted 1 week after the last ring was removed, on the same day of the week. Check often to make sure the ring is still in place, especially before and after sexual intercourse. If the ring was out of the vagina for an unknown amount of time, you may not be protected from pregnancy. Perform a pregnancy test and call your doctor. Do not use more often than directed.  A patient package insert for the product will be given with each prescription and refill. Read this sheet carefully each time. The sheet may change frequently.  Contact your pediatrician regarding the use of this medicine in children. Special care may be needed. This medicine has been used in female children who have started having menstrual periods.  What side effects may I notice from receiving this medicine?  Side effects that you should report to your doctor or health care professional as soon as possible:    breast tissue changes or discharge    changes in vaginal bleeding during your period or between your  periods    chest pain    coughing up blood    dizziness or fainting spells    headaches or migraines    leg, arm or groin pain    severe or sudden headaches    stomach pain (severe)    sudden shortness of breath    sudden loss of coordination, especially on one side of the body    speech problems    symptoms of vaginal infection like itching, irritation or unusual discharge    tenderness in the upper abdomen    vomiting    weakness or numbness in the arms or legs, especially on one side of the body    yellowing of the eyes or skin  Side effects that usually do not require medical attention (report to your doctor or health care professional if they continue or are bothersome):    breakthrough bleeding and spotting that continues beyond the 3 initial cycles of pills    breast tenderness    mood changes, anxiety, depression, frustration, anger, or emotional outbursts    increased sensitivity to sun or ultraviolet light    nausea    skin rash, acne, or brown spots on the skin    weight gain (slight)  What may interact with this medicine?    acetaminophen    antibiotics or medicines for infections, especially rifampin, rifabutin, rifapentine, and griseofulvin, and possibly penicillins or tetracyclines    aprepitant    ascorbic acid (vitamin C)    atorvastatin    barbiturate medicines, such as phenobarbital    bosentan    carbamazepine    caffeine    clofibrate    cyclosporine    dantrolene    doxercalciferol    felbamate    grapefruit juice    hydrocortisone    medicines for anxiety or sleeping problems, such as diazepam or temazepam    medicines for diabetes, including pioglitazone    modafinil    mycophenolate    nefazodone    oxcarbazepine    phenytoin    prednisolone    ritonavir or other medicines for HIV infection or AIDS    rosuvastatin    selegiline    soy isoflavones supplements    Cologne's wort    tamoxifen or raloxifene    theophylline    thyroid hormones    topiramate    warfarin  What if I miss a  dose?  You will need to replace your vaginal ring once a month as directed. If the ring should slip out, or if you leave it in longer or shorter than you should, contact your health care professional for advice.  Where should I keep my medicine?  Keep out of the reach of children.  Store at room temperature between 15 and 30 degrees C (59 and 86 degrees F) for up to 4 months. The product will  after 4 months. Protect from light. Throw away any unused medicine after the expiration date.  What should I tell my health care provider before I take this medicine?  They need to know if you have or ever had any of these conditions:    abnormal vaginal bleeding    blood vessel disease or blood clots    breast, cervical, endometrial, ovarian, liver, or uterine cancer    diabetes    gallbladder disease    heart disease or recent heart attack    high blood pressure    high cholesterol    kidney disease    liver disease    migraine headaches    stroke    systemic lupus erythematosus (SLE)    tobacco smoker    an unusual or allergic reaction to estrogens, progestins, other medicines, foods, dyes, or preservatives    pregnant or trying to get pregnant    breast-feeding  What should I watch for while using this medicine?  Visit your doctor or health care professional for regular checks on your progress. You will need a regular breast and pelvic exam and Pap smear while on this medicine.  Use an additional method of contraception during the first cycle that you use this ring. Do not use a diaphragm or female condom, as the ring can interfere with these birth control methods and their proper placement.  If you have any reason to think you are pregnant, stop using this medicine right away and contact your doctor or health care professional.  If you are using this medicine for hormone related problems, it may take several cycles of use to see improvement in your condition.  Smoking increases the risk of getting a blood clot or  having a stroke while you are using hormonal birth control, especially if you are more than 35 years old. You are strongly advised not to smoke.  This medicine can make your body retain fluid, making your fingers, hands, or ankles swell. Your blood pressure can go up. Contact your doctor or health care professional if you feel you are retaining fluid.  This medicine can make you more sensitive to the sun. Keep out of the sun. If you cannot avoid being in the sun, wear protective clothing and use sunscreen. Do not use sun lamps or tanning beds/booths.  If you wear contact lenses and notice visual changes, or if the lenses begin to feel uncomfortable, consult your eye care specialist.  In some women, tenderness, swelling, or minor bleeding of the gums may occur. Notify your dentist if this happens. Brushing and flossing your teeth regularly may help limit this. See your dentist regularly and inform your dentist of the medicines you are taking.  If you are going to have elective surgery, you may need to stop using this medicine before the surgery. Consult your health care professional for advice.  This medicine does not protect you against HIV infection (AIDS) or any other sexually transmitted diseases.  NOTE:This sheet is a summary. It may not cover all possible information. If you have questions about this medicine, talk to your doctor, pharmacist, or health care provider. Copyright  2017 [employer]

## 2018-04-09 NOTE — MR AVS SNAPSHOT
After Visit Summary   4/9/2018    Lilian Middleton    MRN: [id number]           Patient Information     Date Of Birth          1970        Visit Information        Provider Department      4/9/2018 8:40 AM Evi Ibarra APRN [facility]        Today's Diagnoses     Encounter for initial prescription of vaginal ring hormonal contraceptive    -  1    Moderate episode of recurrent major depressive disorder (H)          Care Instructions    Call insurance to find out if tubal ligation (tubes tied) is covered. Otherwise options are IUD or Nexplanon (felice implant in arm).  Dr. Ulrich - gynecologist [place] office. Call [phone]  [place] - Dr. Dennis Aviles or Dr. Morris Shepard - [phone]  TONYA Tyler        Ethinyl Estradiol; Etonogestrel vaginal ring  Brand Name: NuvaRing  What is this medicine?  ETHINYL ESTRADIOL; ETONOGESTREL (ETH in il es tra DYE ole; et oh charline ERENDIRA trel) vaginal ring is a flexible, vaginal ring used as a contraceptive (birth control method). This medicine combines two types of female hormones, an estrogen and a progestin. This ring is used to prevent ovulation and pregnancy. Each ring is effective for one month.  How should I use this medicine?  Insert the ring into your vagina as directed. Follow the directions on the prescription label. The ring will remain place for 3 weeks and is then removed for a 1-week break. A new ring is inserted 1 week after the last ring was removed, on the same day of the week. Check often to make sure the ring is still in place, especially before and after sexual intercourse. If the ring was out of the vagina for an unknown amount of time, you may not be protected from pregnancy. Perform a pregnancy test and call your doctor. Do not use more often than directed.  A patient package insert for the product will be given with each prescription and refill. Read this sheet carefully each time. The sheet  may change frequently.  Contact your pediatrician regarding the use of this medicine in children. Special care may be needed. This medicine has been used in female children who have started having menstrual periods.  What side effects may I notice from receiving this medicine?  Side effects that you should report to your doctor or health care professional as soon as possible:    breast tissue changes or discharge    changes in vaginal bleeding during your period or between your periods    chest pain    coughing up blood    dizziness or fainting spells    headaches or migraines    leg, arm or groin pain    severe or sudden headaches    stomach pain (severe)    sudden shortness of breath    sudden loss of coordination, especially on one side of the body    speech problems    symptoms of vaginal infection like itching, irritation or unusual discharge    tenderness in the upper abdomen    vomiting    weakness or numbness in the arms or legs, especially on one side of the body    yellowing of the eyes or skin  Side effects that usually do not require medical attention (report to your doctor or health care professional if they continue or are bothersome):    breakthrough bleeding and spotting that continues beyond the 3 initial cycles of pills    breast tenderness    mood changes, anxiety, depression, frustration, anger, or emotional outbursts    increased sensitivity to sun or ultraviolet light    nausea    skin rash, acne, or brown spots on the skin    weight gain (slight)  What may interact with this medicine?    acetaminophen    antibiotics or medicines for infections, especially rifampin, rifabutin, rifapentine, and griseofulvin, and possibly penicillins or tetracyclines    aprepitant    ascorbic acid (vitamin C)    atorvastatin    barbiturate medicines, such as phenobarbital    bosentan    carbamazepine    caffeine    clofibrate    cyclosporine    dantrolene    doxercalciferol    felbamate    grapefruit  juice    hydrocortisone    medicines for anxiety or sleeping problems, such as diazepam or temazepam    medicines for diabetes, including pioglitazone    modafinil    mycophenolate    nefazodone    oxcarbazepine    phenytoin    prednisolone    ritonavir or other medicines for HIV infection or AIDS    rosuvastatin    selegiline    soy isoflavones supplements    Eli's wort    tamoxifen or raloxifene    theophylline    thyroid hormones    topiramate    warfarin  What if I miss a dose?  You will need to replace your vaginal ring once a month as directed. If the ring should slip out, or if you leave it in longer or shorter than you should, contact your health care professional for advice.  Where should I keep my medicine?  Keep out of the reach of children.  Store at room temperature between 15 and 30 degrees C (59 and 86 degrees F) for up to 4 months. The product will  after 4 months. Protect from light. Throw away any unused medicine after the expiration date.  What should I tell my health care provider before I take this medicine?  They need to know if you have or ever had any of these conditions:    abnormal vaginal bleeding    blood vessel disease or blood clots    breast, cervical, endometrial, ovarian, liver, or uterine cancer    diabetes    gallbladder disease    heart disease or recent heart attack    high blood pressure    high cholesterol    kidney disease    liver disease    migraine headaches    stroke    systemic lupus erythematosus (SLE)    tobacco smoker    an unusual or allergic reaction to estrogens, progestins, other medicines, foods, dyes, or preservatives    pregnant or trying to get pregnant    breast-feeding  What should I watch for while using this medicine?  Visit your doctor or health care professional for regular checks on your progress. You will need a regular breast and pelvic exam and Pap smear while on this medicine.  Use an additional method of contraception during the first  cycle that you use this ring. Do not use a diaphragm or female condom, as the ring can interfere with these birth control methods and their proper placement.  If you have any reason to think you are pregnant, stop using this medicine right away and contact your doctor or health care professional.  If you are using this medicine for hormone related problems, it may take several cycles of use to see improvement in your condition.  Smoking increases the risk of getting a blood clot or having a stroke while you are using hormonal birth control, especially if you are more than 35 years old. You are strongly advised not to smoke.  This medicine can make your body retain fluid, making your fingers, hands, or ankles swell. Your blood pressure can go up. Contact your doctor or health care professional if you feel you are retaining fluid.  This medicine can make you more sensitive to the sun. Keep out of the sun. If you cannot avoid being in the sun, wear protective clothing and use sunscreen. Do not use sun lamps or tanning beds/booths.  If you wear contact lenses and notice visual changes, or if the lenses begin to feel uncomfortable, consult your eye care specialist.  In some women, tenderness, swelling, or minor bleeding of the gums may occur. Notify your dentist if this happens. Brushing and flossing your teeth regularly may help limit this. See your dentist regularly and inform your dentist of the medicines you are taking.  If you are going to have elective surgery, you may need to stop using this medicine before the surgery. Consult your health care professional for advice.  This medicine does not protect you against HIV infection (AIDS) or any other sexually transmitted diseases.  NOTE:This sheet is a summary. It may not cover all possible information. If you have questions about this medicine, talk to your doctor, pharmacist, or health care provider. Copyright  2017 [employer]                Follow-ups after your  "visit        Who to contact     If you have questions or need follow up information about today's clinic visit or your schedule please contact [facility] directly at [phone].  Normal or non-critical lab and imaging results will be communicated to you by MyChart, letter or phone within 4 business days after the clinic has received the results. If you do not hear from us within 7 days, please contact the clinic through MyChart or phone. If you have a critical or abnormal lab result, we will notify you by phone as soon as possible.  Submit refill requests through [employer] or call your pharmacy and they will forward the refill request to us. Please allow 3 business days for your refill to be completed.          Additional Information About Your Visit        [facility] Information     [employer] lets you send messages to your doctor, view your test results, renew your prescriptions, schedule appointments and more. To sign up, go to [email or web address] . Click on \"Log in\" on the left side of the screen, which will take you to the Welcome page. Then click on \"Sign up Now\" on the right side of the page.     You will be asked to enter the access code listed below, as well as some personal information. Please follow the directions to create your username and password.     Your access code is: [email or web address]  Expires: 2018  9:27 AM     Your access code will  in 90 days. If you need help or a new code, please call your [facility] clinic or [phone].        Care EveryWhere ID     This is your Care EveryWhere ID. This could be used by other organizations to access your [facility] medical records  [id number]        Your Vitals Were     Pulse Temperature Respirations Height BMI (Body Mass Index)       76 98.2  F (36.8  C) (Temporal) 16 5' 4.17\" (1.63 m) 39.86 kg/m2        Blood Pressure from Last 3 Encounters:   18 (!) 136/94   17 116/82   10/20/17 110/70    Weight from Last 3 Encounters: "   04/09/18 233 lb 8 oz (105.9 kg)   12/08/17 229 lb 8 oz (104.1 kg)   10/20/17 229 lb 11.2 oz (104.2 kg)              Today, you had the following     No orders found for display         Today's Medication Changes          These changes are accurate as of 4/9/18  9:27 AM.  If you have any questions, ask your nurse or doctor.               Start taking these medicines.        Dose/Directions    etonogestrel-ethinyl estradiol 0.12-0.015 MG/24HR vaginal ring   Commonly known as:  NUVARING   Used for:  Encounter for initial prescription of vaginal ring hormonal contraceptive   Started by:  Evi Ibarra APRN CNP        Insert 1 ring vaginally every 21 days then remove for 1 week then repeat with new ring.   Quantity:  1 each   Refills:  11            Where to get your medicines      These medications were sent to [facility] [place] - [address] [place] [address]  [address] [place] [address], [place] [address]     Phone:  [phone]     citalopram 10 MG tablet    etonogestrel-ethinyl estradiol 0.12-0.015 MG/24HR vaginal ring                Primary Care Provider Office Phone # Fax #    ZEN Lozano [phone] [address] [address] [place] [id number]        Equal Access to Services     RANDELL DARDEN AH: Hadii navarro abreu hadasho Socorby, waaxda luqadaha, qaybta kaalmada bertyada, markell reinoso . So [facility] [phone].    ATENCIÓN: Si habla español, tiene a drew disposición servicios gratuitos de asistencia lingüística. Jacoby al [phone].    We comply with applicable federal civil rights laws and [place] laws. We do not discriminate on the basis of race, color, national origin, age, disability, sex, sexual orientation, or gender identity.            Thank you!     Thank you for choosing [facility]  for your care. Our goal is always to provide you with excellent care. Hearing back from our  patients is one way we can continue to improve our services. Please take a few minutes to complete the written survey that you may receive in the mail after your visit with us. Thank you!             Your Updated Medication List - Protect others around you: Learn how to safely use, store and throw away your medicines at www.disposemymeds.org.          This list is accurate as of 4/9/18  9:27 AM.  Always use your most recent med list.                   Brand Name Dispense Instructions for use Diagnosis    citalopram 10 MG tablet    celeXA    90 tablet    Take 1 tablet (10 mg) by mouth daily    Moderate episode of recurrent major depressive disorder (H)       etonogestrel-ethinyl estradiol 0.12-0.015 MG/24HR vaginal ring    NUVARING    1 each    Insert 1 ring vaginally every 21 days then remove for 1 week then repeat with new ring.    Encounter for initial prescription of vaginal ring hormonal contraceptive       ketoconazole 2 % cream    NIZORAL    60 g    Apply topically 2 times daily    Candidal intertrigo       TYLENOL PO      Take 500 mg by mouth as needed for mild pain or fever Reported on 3/8/2017

## 2018-04-09 NOTE — LETTER
My Asthma Action Plan  Name: Lilian Middleton   YOB: 1970  Date: 4/9/2018   My doctor: ZEN Chandler CNP   My clinic: [facility]        My Control Medicine: None  My Rescue Medicine: Albuterol (Proair/Ventolin/Proventil) inhaler every 6 hours as needed   My Asthma Severity: intermittent  Avoid your asthma triggers: exercise or sports               GREEN ZONE   Good Control    I feel good    No cough or wheeze    Can work, sleep and play without asthma symptoms       Take your asthma control medicine every day.     1. If exercise triggers your asthma, take your rescue medication    15 minutes before exercise or sports, and    During exercise if you have asthma symptoms  2. Spacer to use with inhaler: If you have a spacer, make sure to use it with your inhaler             YELLOW ZONE Getting Worse  I have ANY of these:    I do not feel good    Cough or wheeze    Chest feels tight    Wake up at night   1. Keep taking your Green Zone medications  2. Start taking your rescue medicine:    every 20 minutes for up to 1 hour. Then every 4 hours for 24-48 hours.  3. If you stay in the Yellow Zone for more than 12-24 hours, contact your doctor.  4. If you do not return to the Green Zone in 12-24 hours or you get worse, start taking your oral steroid medicine if prescribed by your provider.           RED ZONE Medical Alert - Get Help  I have ANY of these:    I feel awful    Medicine is not helping    Breathing getting harder    Trouble walking or talking    Nose opens wide to breathe       1. Take your rescue medicine NOW  2. If your provider has prescribed an oral steroid medicine, start taking it NOW  3. Call your doctor NOW  4. If you are still in the Red Zone after 20 minutes and you have not reached your doctor:    Take your rescue medicine again and    Call 911 or go to the emergency room right away    See your regular doctor within 2 weeks of an Emergency Room or Urgent Care  visit for follow-up treatment.          Annual Reminders:  Meet with Asthma Educator,  Flu Shot in the Fall, consider Pneumonia Vaccination for patients with asthma (aged 19 and older).    Pharmacy: [facility] [place] [address] [address]                      Asthma Triggers  How To Control Things That Make Your Asthma Worse    Triggers are things that make your asthma worse.  Look at the list below to help you find your triggers and what you can do about them.  You can help prevent asthma flare-ups by staying away from your triggers.      Trigger                                                          What you can do   Cigarette Smoke  Tobacco smoke can make asthma worse. Do not allow smoking in your home, car or around you.  Be sure no one smokes at a child s day care or school.  If you smoke, ask your health care provider for ways to help you quit.  Ask family members to quit too.  Ask your health care provider for a referral to Quit Plan to help you quit smoking, or call 9-830-125-PLAN.     Colds, Flu, Bronchitis  These are common triggers of asthma. Wash your hands often.  Don t touch your eyes, nose or mouth.  Get a flu shot every year.     Dust Mites  These are tiny bugs that live in cloth or carpet. They are too small to see. Wash sheets and blankets in hot water every week.   Encase pillows and mattress in dust mite proof covers.  Avoid having carpet if you can. If you have carpet, vacuum weekly.   Use a dust mask and HEPA vacuum.   Pollen and Outdoor Mold  Some people are allergic to trees, grass, or weed pollen, or molds. Try to keep your windows closed.  Limit time out doors when pollen count is high.   Ask you health care provider about taking medicine during allergy season.     Animal Dander  Some people are allergic to skin flakes, urine or saliva from pets with fur or feathers. Keep pets with fur or feathers out of your home.    If you can t keep the pet  outdoors, then keep the pet out of your bedroom.  Keep the bedroom door closed.  Keep pets off cloth furniture and away from stuffed toys.     Mice, Rats, and Cockroaches  Some people are allergic to the waste from these pests.   Cover food and garbage.  Clean up spills and food crumbs.  Store grease in the refrigerator.   Keep food out of the bedroom.   Indoor Mold  This can be a trigger if your home has high moisture. Fix leaking faucets, pipes, or other sources of water.   Clean moldy surfaces.  Dehumidify basement if it is damp and smelly.   Smoke, Strong Odors, and Sprays  These can reduce air quality. Stay away from strong odors and sprays, such as perfume, powder, hair spray, paints, smoke incense, paint, cleaning products, candles and new carpet.   Exercise or Sports  Some people with asthma have this trigger. Be active!  Ask your doctor about taking medicine before sports or exercise to prevent symptoms.    Warm up for 5-10 minutes before and after sports or exercise.     Other Triggers of Asthma  Cold air:  Cover your nose and mouth with a scarf.  Sometimes laughing or crying can be a trigger.  Some medicines and food can trigger asthma.

## 2018-04-09 NOTE — LETTER
My Asthma Action Plan  Name: Lilian Middleton   YOB: 1970  Date: 4/18/2018   My doctor: ZEN Chandler CNP   My clinic: [facility]        My Control Medicine: None  My Rescue Medicine: Albuterol (Proair/Ventolin/Proventil) inhaler every 4 hours as needed for shortness of breath   My Asthma Severity: intermittent  Avoid your asthma triggers: exercise or sports               GREEN ZONE   Good Control    I feel good    No cough or wheeze    Can work, sleep and play without asthma symptoms       Take your asthma control medicine every day.     1. If exercise triggers your asthma, take your rescue medication    15 minutes before exercise or sports, and    During exercise if you have asthma symptoms  2. Spacer to use with inhaler: If you have a spacer, make sure to use it with your inhaler             YELLOW ZONE Getting Worse  I have ANY of these:    I do not feel good    Cough or wheeze    Chest feels tight    Wake up at night   1. Keep taking your Green Zone medications  2. Start taking your rescue medicine:    every 20 minutes for up to 1 hour. Then every 4 hours for 24-48 hours.  3. If you stay in the Yellow Zone for more than 12-24 hours, contact your doctor.  4. If you do not return to the Green Zone in 12-24 hours or you get worse, start taking your oral steroid medicine if prescribed by your provider.           RED ZONE Medical Alert - Get Help  I have ANY of these:    I feel awful    Medicine is not helping    Breathing getting harder    Trouble walking or talking    Nose opens wide to breathe       1. Take your rescue medicine NOW  2. If your provider has prescribed an oral steroid medicine, start taking it NOW  3. Call your doctor NOW  4. If you are still in the Red Zone after 20 minutes and you have not reached your doctor:    Take your rescue medicine again and    Call 911 or go to the emergency room right away    See your regular doctor within 2 weeks of an  Emergency Room or Urgent Care visit for follow-up treatment.          Annual Reminders:  Meet with Asthma Educator,  Flu Shot in the Fall, consider Pneumonia Vaccination for patients with asthma (aged 19 and older).    Pharmacy: [facility] [place] - [address] [address]                      Asthma Triggers  How To Control Things That Make Your Asthma Worse    Triggers are things that make your asthma worse.  Look at the list below to help you find your triggers and what you can do about them.  You can help prevent asthma flare-ups by staying away from your triggers.      Trigger                                                          What you can do   Cigarette Smoke  Tobacco smoke can make asthma worse. Do not allow smoking in your home, car or around you.  Be sure no one smokes at a child s day care or school.  If you smoke, ask your health care provider for ways to help you quit.  Ask family members to quit too.  Ask your health care provider for a referral to Quit Plan to help you quit smoking, or call 9-011-038-PLAN.     Colds, Flu, Bronchitis  These are common triggers of asthma. Wash your hands often.  Don t touch your eyes, nose or mouth.  Get a flu shot every year.     Dust Mites  These are tiny bugs that live in cloth or carpet. They are too small to see. Wash sheets and blankets in hot water every week.   Encase pillows and mattress in dust mite proof covers.  Avoid having carpet if you can. If you have carpet, vacuum weekly.   Use a dust mask and HEPA vacuum.   Pollen and Outdoor Mold  Some people are allergic to trees, grass, or weed pollen, or molds. Try to keep your windows closed.  Limit time out doors when pollen count is high.   Ask you health care provider about taking medicine during allergy season.     Animal Dander  Some people are allergic to skin flakes, urine or saliva from pets with fur or feathers. Keep pets with fur or feathers out of your home.     If you can t keep the pet outdoors, then keep the pet out of your bedroom.  Keep the bedroom door closed.  Keep pets off cloth furniture and away from stuffed toys.     Mice, Rats, and Cockroaches  Some people are allergic to the waste from these pests.   Cover food and garbage.  Clean up spills and food crumbs.  Store grease in the refrigerator.   Keep food out of the bedroom.   Indoor Mold  This can be a trigger if your home has high moisture. Fix leaking faucets, pipes, or other sources of water.   Clean moldy surfaces.  Dehumidify basement if it is damp and smelly.   Smoke, Strong Odors, and Sprays  These can reduce air quality. Stay away from strong odors and sprays, such as perfume, powder, hair spray, paints, smoke incense, paint, cleaning products, candles and new carpet.   Exercise or Sports  Some people with asthma have this trigger. Be active!  Ask your doctor about taking medicine before sports or exercise to prevent symptoms.    Warm up for 5-10 minutes before and after sports or exercise.     Other Triggers of Asthma  Cold air:  Cover your nose and mouth with a scarf.  Sometimes laughing or crying can be a trigger.  Some medicines and food can trigger asthma.

## 2018-04-10 ASSESSMENT — PATIENT HEALTH QUESTIONNAIRE - PHQ9: SUM OF ALL RESPONSES TO PHQ QUESTIONS 1-9: 16

## 2018-04-18 PROBLEM — F33.1 MODERATE EPISODE OF RECURRENT MAJOR DEPRESSIVE DISORDER (H): Status: ACTIVE | Noted: 2018-04-18

## 2018-06-26 NOTE — PROGRESS NOTES
"  SUBJECTIVE:   Lilian Middleton is a 47 year old female who presents to clinic today for the following health issues:      HPI  Joint Pain    Onset: 2 months    Description:   Location: Feet, mostly right   Character: Shooting, \"feels very severe, can barely walk sometimes it feels so bad\"    Intensity: 9/10    Progression of Symptoms: worse    Accompanying Signs & Symptoms:  Other symptoms: numbness - toe, swelling, pain radiation to low back, shoulder pain started this morning.    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     Answers for HPI/ROS submitted by the patient on 6/29/2018   If you checked off any problems, how difficult have these problems made it for you to do your work, take care of things at home, or get along with other people?: Not difficult at all  PHQ9 TOTAL SCORE: 10    Problem list and histories reviewed & adjusted, as indicated.  Additional history: as documented    BP Readings from Last 3 Encounters:   06/29/18 122/74   04/09/18 (!) 136/94   12/08/17 116/82    Wt Readings from Last 3 Encounters:   06/29/18 223 lb 6.4 oz (101.3 kg)   04/09/18 233 lb 8 oz (105.9 kg)   12/08/17 229 lb 8 oz (104.1 kg)                  Labs reviewed in EPIC    ROS:  Constitutional, HEENT, cardiovascular, pulmonary, gi and gu systems are negative, except as otherwise noted.    OBJECTIVE:     /74  Pulse 64  Temp 97.8  F (36.6  C) (Temporal)  Resp 12  Ht 5' 4.17\" (1.63 m)  Wt 223 lb 6.4 oz (101.3 kg)  BMI 38.14 kg/m2  Body mass index is 38.14 kg/(m^2).  GENERAL: healthy, alert and no acute distress  RESP: lungs clear to auscultation - no rales, rhonchi or wheezes  CV: regular rate and rhythm, normal S1 S2, no S3 or S4, no murmur, " "click or rub, no peripheral edema and peripheral pulses strong  MS: Right hallux valgus with bunion; mild edema at right lateral ankle; tenderness to palpation of plantar fascia bilaterally   SKIN: calloused and dry flaking skin around perimeter of both feet.   NEURO: Normal strength and tone, mentation intact and speech normal  PSYCH: mentation appears normal, affect normal/bright    Diagnostic Test Results:  none     ASSESSMENT/PLAN:     1. Plantar fasciitis  Recommend conservative management at this point - see patient instructions. Discussed referral for custom orthotics but patient will hold off for now. She will wear the supportive footwear at work, stretching well before and after work, look into getting braces to wear at night to dorsiflex feet, take NSAIDS daily for 10 days and work on weight loss.    2. Morbid obesity (H)  Patient is working on getting back to the pool to swim on a regular basis for exercise. She is also working on eating healthier.      Patient Instructions       Plantar fasciitis  Consider arch support inserts  Roll foot on a tennis ball or frozen water bottle.  Look online for a plantar fasciitis brace to \"dorsiflex\" your foot at night. Wear every night until symptoms improve.  Do a lot of stretching before you go to work.  Aleve - take twice daily for 10 days. Take with food.  Evi Ibarra, MENG-C    Reliable shoe stores: To maximize your experience and provide the best possible fit.        Stores listed in bold have only athletic shoes, and stores that are not bold are mostly casual or variety of shoes    [employer]  [address] [place] [address]  [phone]    [employer]  [address] [place] [address]  [phone]    [employer]  [address] [place], [place] [address] [phone]    [employer]  [address] [place] [address]  [phone]                         [employer] Locations:     [address] [place] " [address]   [phone]     [address] [place] [address] [address] [place] [address]   [phone]     [address] [place] [address]   [phone] [address] [place] [address]   [phone]     [address] [place] [address]   [phone]     [address] [facility] [place] [address]   [phone]     [address] [facility] [address]   [place] [address]   [phone]     [address] [facility]   [place] [address]   [phone]             How to find reasonable shoes          The correct width    Correct Fitting    Correct Length      Foot Distortion    Posture Distortion                          Torsional Rigidity      Grasp behind the heel and underneath the foot and twist      Bad    Excessive torsion/twist in midfoot     Less torsion/twist in midfoot is better                   Heel Counter Rigidity      Grasp just above   midsole and squeeze      Bad    Soft heel counter      Good    Rigid Heel Counter      Flexion Rigidity      Grasp shoe and bend from forefoot to rearfoot                Plantar Fasciitis  Plantar fasciitis is a painful swelling of the plantar fascia. The plantar fascia is a thick, fibrous layer of tissue. It covers the bones on the bottom of your foot. And it supports the foot bones in an arched position.  This can happen gradually or suddenly. It usually affects one foot at a time. Heel pain can be sharp, like a knife sticking into the bottom of your foot. You may feel pain after exercising, long-distance jogging, stair climbing, long periods of standing, or after standing up.  Risk factors include: non-active lifestyle, arthritis, diabetes, obesity or recent weight gain, flat foot, high arch. Wearing high heels, loose shoes, or shoes with poor arch support for long periods of time adds to the risk. This problem is commonly found in runners and dancers. It also found in people who stand on hard surfaces for long periods of time.  Foot pain from this  condition is usually worse in the morning. But it improves with walking. By the end of the day there may be a dull aching. Treatment requires short-term rest and controlling swelling. It may take up to 9 months before all symptoms go away. Rarely, a steroid injection into the foot, or surgery, may be needed.  Home care    If you are overweight, lose weight to help healing.    Choose supportive shoes with good arch support and shock absorbency. Replace athletic shoes when they become worn out. Don t walk or run barefoot.    Premade or custom-fitted shoe inserts may be helpful. Inserts made of silicone seem to be the most effective. Custom-made inserts can be provided by a podiatrist or foot specialist, physical therapist, or orthopedist.    Premade or custom-made night splints keep the heel stretched out while you sleep. They may prevent morning pain.    Avoid activities that stress the feet: jogging, prolonged standing or walking, contact sports, etc.    First thing in the morning and before sports, stretch the bottom of your feet. Gently flex your ankle so the toes move toward your knee.    Icing may help control heel pain. Apply an ice pack to the heel for 10-20 minutes as a preventive. Or ice your heel after a severe flare-up of symptoms. You may repeat this every 1-2 hours as needed.    You may use over-the-counter pain medicine to control pain, unless another medicine was prescribed. Anti-inflammatory pain medicines, such as ibuprofen or naproxen, may work better than acetaminophen. If you have chronic liver or kidney disease or ever had a stomach ulcer or GI bleeding, talk with your healthcare provider before using these medicines.  Follow-up care  Follow up with your healthcare provider, physical therapist, or podiatrist or foot specialist as advised.  Call for an appointment if pain worsens or there is no relief after a few weeks of home treatment. Shoe inserts, a night splint, or a special boot may be  required.  If X-rays were taken, you will be told of any new findings that may affect your care.  When to seek medical advice  Call your healthcare provider right away if any of these occur:    Foot swelling    Redness with increasing pain  Date Last Reviewed: 11/21/2015 2000-2017 [employer]. [address] [place] [address]. All rights reserved. This information is not intended as a substitute for professional medical care. Always follow your healthcare professional's instructions.            ZEN Chandler [facility]

## 2018-06-29 ENCOUNTER — OFFICE VISIT (OUTPATIENT)
Dept: FAMILY MEDICINE | Facility: OTHER | Age: 48
End: 2018-06-29
Payer: COMMERCIAL

## 2018-06-29 VITALS
WEIGHT: 223.4 LBS | DIASTOLIC BLOOD PRESSURE: 74 MMHG | TEMPERATURE: 97.8 F | HEART RATE: 64 BPM | SYSTOLIC BLOOD PRESSURE: 122 MMHG | HEIGHT: 64 IN | BODY MASS INDEX: 38.14 KG/M2 | RESPIRATION RATE: 12 BRPM

## 2018-06-29 DIAGNOSIS — M72.2 PLANTAR FASCIITIS: Primary | ICD-10-CM

## 2018-06-29 DIAGNOSIS — E66.01 MORBID OBESITY (H): ICD-10-CM

## 2018-06-29 PROCEDURE — 99213 OFFICE O/P EST LOW 20 MIN: CPT | Performed by: STUDENT IN AN ORGANIZED HEALTH CARE EDUCATION/TRAINING PROGRAM

## 2018-06-29 ASSESSMENT — PAIN SCALES - GENERAL: PAINLEVEL: EXTREME PAIN (9)

## 2018-06-29 ASSESSMENT — PATIENT HEALTH QUESTIONNAIRE - PHQ9
10. IF YOU CHECKED OFF ANY PROBLEMS, HOW DIFFICULT HAVE THESE PROBLEMS MADE IT FOR YOU TO DO YOUR WORK, TAKE CARE OF THINGS AT HOME, OR GET ALONG WITH OTHER PEOPLE: NOT DIFFICULT AT ALL
SUM OF ALL RESPONSES TO PHQ QUESTIONS 1-9: 10
SUM OF ALL RESPONSES TO PHQ QUESTIONS 1-9: 10

## 2018-06-29 NOTE — PATIENT INSTRUCTIONS
"  Plantar fasciitis  Consider arch support inserts  Roll foot on a tennis ball or frozen water bottle.  Look online for a plantar fasciitis brace to \"dorsiflex\" your foot at night. Wear every night until symptoms improve.  Do a lot of stretching before you go to work.  Aleve - take twice daily for 10 days. Take with food.  Evi Ibarra, NP-C    Reliable shoe stores: To maximize your experience and provide the best possible fit.        Stores listed in bold have only athletic shoes, and stores that are not bold are mostly casual or variety of shoes    [employer]  [address] [place] [address]  [phone]    [facility]  [address] [facility] [address]  [phone]    [employer]  [address] [place] [address]  [phone]    [employer]  [address] [place] [address]  [phone]                         [employer] Locations:     [address] [place] [address]   [phone]     [address] [place] [address] [address] [place] [address]   [phone]     [address] [place] [address]   [phone]     [address] [place] [address]   [phone]     [address] [place] [address]   [phone]     [address] [place] [address]   [phone]     [address]  [facility] [address]   [place] [address]   [phone]     [address] [facility]   [place] [address]   [phone]             How to find reasonable shoes          The correct width    Correct Fitting    Correct Length      Foot Distortion    Posture Distortion                          Torsional Rigidity      Grasp behind the heel and underneath the foot and twist      Bad    Excessive torsion/twist in midfoot     Less torsion/twist in midfoot is better                   Heel Counter Rigidity      Grasp just above   midsole and squeeze      Bad    Soft heel counter      Good    Rigid Heel Counter      Flexion Rigidity      Grasp shoe and bend " from forefoot to rearfoot                Plantar Fasciitis  Plantar fasciitis is a painful swelling of the plantar fascia. The plantar fascia is a thick, fibrous layer of tissue. It covers the bones on the bottom of your foot. And it supports the foot bones in an arched position.  This can happen gradually or suddenly. It usually affects one foot at a time. Heel pain can be sharp, like a knife sticking into the bottom of your foot. You may feel pain after exercising, long-distance jogging, stair climbing, long periods of standing, or after standing up.  Risk factors include: non-active lifestyle, arthritis, diabetes, obesity or recent weight gain, flat foot, high arch. Wearing high heels, loose shoes, or shoes with poor arch support for long periods of time adds to the risk. This problem is commonly found in runners and dancers. It also found in people who stand on hard surfaces for long periods of time.  Foot pain from this condition is usually worse in the morning. But it improves with walking. By the end of the day there may be a dull aching. Treatment requires short-term rest and controlling swelling. It may take up to 9 months before all symptoms go away. Rarely, a steroid injection into the foot, or surgery, may be needed.  Home care    If you are overweight, lose weight to help healing.    Choose supportive shoes with good arch support and shock absorbency. Replace athletic shoes when they become worn out. Don t walk or run barefoot.    Premade or custom-fitted shoe inserts may be helpful. Inserts made of silicone seem to be the most effective. Custom-made inserts can be provided by a podiatrist or foot specialist, physical therapist, or orthopedist.    Premade or custom-made night splints keep the heel stretched out while you sleep. They may prevent morning pain.    Avoid activities that stress the feet: jogging, prolonged standing or walking, contact sports, etc.    First thing in the morning and before  sports, stretch the bottom of your feet. Gently flex your ankle so the toes move toward your knee.    Icing may help control heel pain. Apply an ice pack to the heel for 10-20 minutes as a preventive. Or ice your heel after a severe flare-up of symptoms. You may repeat this every 1-2 hours as needed.    You may use over-the-counter pain medicine to control pain, unless another medicine was prescribed. Anti-inflammatory pain medicines, such as ibuprofen or naproxen, may work better than acetaminophen. If you have chronic liver or kidney disease or ever had a stomach ulcer or GI bleeding, talk with your healthcare provider before using these medicines.  Follow-up care  Follow up with your healthcare provider, physical therapist, or podiatrist or foot specialist as advised.  Call for an appointment if pain worsens or there is no relief after a few weeks of home treatment. Shoe inserts, a night splint, or a special boot may be required.  If X-rays were taken, you will be told of any new findings that may affect your care.  When to seek medical advice  Call your healthcare provider right away if any of these occur:    Foot swelling    Redness with increasing pain  Date Last Reviewed: 11/21/2015 2000-2017 [email or web address]. [address] [place] [address], [place] [address]. All rights reserved. This information is not intended as a substitute for professional medical care. Always follow your healthcare professional's instructions.

## 2018-06-29 NOTE — MR AVS SNAPSHOT
"              After Visit Summary   6/29/2018    Lilian Middleton    MRN: [id number]           Patient Information     Date Of Birth          1970        Visit Information        Provider Department      6/29/2018 11:40 AM Evi Ibarra APRN [facility]'s Diagnoses     Plantar fasciitis    -  1    Screening for HIV (human immunodeficiency virus)        Morbid obesity (H)          Care Instructions      Plantar fasciitis  Consider arch support inserts  Roll foot on a tennis ball or frozen water bottle.  Look online for a plantar fasciitis brace to \"dorsiflex\" your foot at night. Wear every night until symptoms improve.  Do a lot of stretching before you go to work.  Aleve - take twice daily for 10 days. Take with food.  Evi Ibarra, MENG-C    Reliable shoe stores: To maximize your experience and provide the best possible fit.        Stores listed in bold have only athletic shoes, and stores that are not bold are mostly casual or variety of shoes    [employer]  [address] [place] [address]  [phone]    [employer]  [address] [facility] [address]  [phone]    [employer]  [address] [place], [place] [address] [phone]    [employer]  [address] [place] [address]  [phone]                         [employer] Locations:     [address] [place] [address]   [phone]     [address] [facility] [address] [facility] [address] [place] [address]   [phone]     [address] [place] [address]   [phone]     [address] [place] [address]   [phone]     [address] [place] [address]   [phone]     [address] [place] [address]   [phone]     [address] [facility] [address]   [place] [address]   [phone]     [address]   [place] [address]   [phone]             How to find reasonable shoes          The correct width    Correct " Fitting    Correct Length      Foot Distortion    Posture Distortion                          Torsional Rigidity      Grasp behind the heel and underneath the foot and twist      Bad    Excessive torsion/twist in midfoot     Less torsion/twist in midfoot is better                   Heel Counter Rigidity      Grasp just above   midsole and squeeze      Bad    Soft heel counter      Good    Rigid Heel Counter      Flexion Rigidity      Grasp shoe and bend from forefoot to rearfoot                Plantar Fasciitis  Plantar fasciitis is a painful swelling of the plantar fascia. The plantar fascia is a thick, fibrous layer of tissue. It covers the bones on the bottom of your foot. And it supports the foot bones in an arched position.  This can happen gradually or suddenly. It usually affects one foot at a time. Heel pain can be sharp, like a knife sticking into the bottom of your foot. You may feel pain after exercising, long-distance jogging, stair climbing, long periods of standing, or after standing up.  Risk factors include: non-active lifestyle, arthritis, diabetes, obesity or recent weight gain, flat foot, high arch. Wearing high heels, loose shoes, or shoes with poor arch support for long periods of time adds to the risk. This problem is commonly found in runners and dancers. It also found in people who stand on hard surfaces for long periods of time.  Foot pain from this condition is usually worse in the morning. But it improves with walking. By the end of the day there may be a dull aching. Treatment requires short-term rest and controlling swelling. It may take up to 9 months before all symptoms go away. Rarely, a steroid injection into the foot, or surgery, may be needed.  Home care    If you are overweight, lose weight to help healing.    Choose supportive shoes with good arch support and shock absorbency. Replace athletic shoes when they become worn out. Don t walk or run barefoot.    Premade or  custom-fitted shoe inserts may be helpful. Inserts made of silicone seem to be the most effective. Custom-made inserts can be provided by a podiatrist or foot specialist, physical therapist, or orthopedist.    Premade or custom-made night splints keep the heel stretched out while you sleep. They may prevent morning pain.    Avoid activities that stress the feet: jogging, prolonged standing or walking, contact sports, etc.    First thing in the morning and before sports, stretch the bottom of your feet. Gently flex your ankle so the toes move toward your knee.    Icing may help control heel pain. Apply an ice pack to the heel for 10-20 minutes as a preventive. Or ice your heel after a severe flare-up of symptoms. You may repeat this every 1-2 hours as needed.    You may use over-the-counter pain medicine to control pain, unless another medicine was prescribed. Anti-inflammatory pain medicines, such as ibuprofen or naproxen, may work better than acetaminophen. If you have chronic liver or kidney disease or ever had a stomach ulcer or GI bleeding, talk with your healthcare provider before using these medicines.  Follow-up care  Follow up with your healthcare provider, physical therapist, or podiatrist or foot specialist as advised.  Call for an appointment if pain worsens or there is no relief after a few weeks of home treatment. Shoe inserts, a night splint, or a special boot may be required.  If X-rays were taken, you will be told of any new findings that may affect your care.  When to seek medical advice  Call your healthcare provider right away if any of these occur:    Foot swelling    Redness with increasing pain  Date Last Reviewed: 11/21/2015 2000-2017 [employer]. [address] [place] [address], [place] [address]. All rights reserved. This information is not intended as a substitute for professional medical care. Always follow your healthcare professional's instructions.                Follow-ups  "after your visit        Who to contact     If you have questions or need follow up information about today's clinic visit or your schedule please contact [facility] directly at [phone].  Normal or non-critical lab and imaging results will be communicated to you by MyChart, letter or phone within 4 business days after the clinic has received the results. If you do not hear from us within 7 days, please contact the clinic through MyChart or phone. If you have a critical or abnormal lab result, we will notify you by phone as soon as possible.  Submit refill requests through [employer] or call your pharmacy and they will forward the refill request to us. Please allow 3 business days for your refill to be completed.          Additional Information About Your Visit        [employer] Information     [employer] lets you send messages to your doctor, view your test results, renew your prescriptions, schedule appointments and more. To sign up, go to [email or web address] . Click on \"Log in\" on the left side of the screen, which will take you to the Welcome page. Then click on \"Sign up Now\" on the right side of the page.     You will be asked to enter the access code listed below, as well as some personal information. Please follow the directions to create your username and password.     Your access code is: [email or web address]  Expires: 2018  9:27 AM     Your access code will  in 90 days. If you need help or a new code, please call your [facility] clinic or [phone].        Care EveryWhere ID     This is your Care EveryWhere ID. This could be used by other organizations to access your [facility] medical records  [id number]        Your Vitals Were     Pulse Temperature Respirations Height BMI (Body Mass Index)       64 97.8  F (36.6  C) (Temporal) 12 5' 4.17\" (1.63 m) 38.14 kg/m2        Blood Pressure from Last 3 Encounters:   18 122/74   18 (!) 136/94   17 116/82    Weight from Last 3 " Encounters:   06/29/18 223 lb 6.4 oz (101.3 kg)   04/09/18 233 lb 8 oz (105.9 kg)   12/08/17 229 lb 8 oz (104.1 kg)              Today, you had the following     No orders found for display       Primary Care Provider Office Phone # Fax #    ZEN L[facility] [phone] [address] [facility] [address]        Equal Access to Services     KARLOS DARDEN : Hadii aad ku hadasho Soomaali, waaxda luqadaha, qaybta kaalmada adeegyada, waxay idiin hayaan adeeg kharash laabhilash . So [facility] [phone].    ATENCIÓN: Si habla español, tiene a drew disposición servicios gratuitos de asistencia lingüística. LlThe [facility] [phone].    We comply with applicable federal civil rights laws and [place] laws. We do not discriminate on the basis of race, color, national origin, age, disability, sex, sexual orientation, or gender identity.            Thank you!     Thank you for choosing [facility]  for your care. Our goal is always to provide you with excellent care. Hearing back from our patients is one way we can continue to improve our services. Please take a few minutes to complete the written survey that you may receive in the mail after your visit with us. Thank you!             Your Updated Medication List - Protect others around you: Learn how to safely use, store and throw away your medicines at www.disposemymeds.org.          This list is accurate as of 6/29/18 12:03 PM.  Always use your most recent med list.                   Brand Name Dispense Instructions for use Diagnosis    albuterol 108 (90 Base) MCG/ACT Inhaler    PROAIR HFA/PROVENTIL HFA/VENTOLIN HFA    1 Inhaler    Inhale 2 puffs into the lungs every 6 hours as needed for shortness of breath / dyspnea or wheezing    Exercise-induced asthma       citalopram 10 MG tablet    celeXA    90 tablet    Take 1 tablet (10 mg) by mouth daily    Moderate episode of recurrent major depressive disorder (H)        etonogestrel-ethinyl estradiol 0.12-0.015 MG/24HR vaginal ring    NUVARING    1 each    Insert 1 ring vaginally every 21 days then remove for 1 week then repeat with new ring.    Encounter for initial prescription of vaginal ring hormonal contraceptive       ketoconazole 2 % cream    NIZORAL    60 g    Apply topically 2 times daily    Candidal intertrigo       TYLENOL PO      Take 500 mg by mouth as needed for mild pain or fever Reported on 3/8/2017

## 2018-06-30 ASSESSMENT — ASTHMA QUESTIONNAIRES: ACT_TOTALSCORE: 25

## 2018-06-30 ASSESSMENT — PATIENT HEALTH QUESTIONNAIRE - PHQ9: SUM OF ALL RESPONSES TO PHQ QUESTIONS 1-9: 10

## 2018-12-03 ENCOUNTER — OFFICE VISIT (OUTPATIENT)
Dept: FAMILY MEDICINE | Facility: OTHER | Age: 48
End: 2018-12-03
Payer: COMMERCIAL

## 2018-12-03 VITALS
SYSTOLIC BLOOD PRESSURE: 118 MMHG | HEIGHT: 64 IN | DIASTOLIC BLOOD PRESSURE: 80 MMHG | WEIGHT: 229.3 LBS | TEMPERATURE: 98 F | HEART RATE: 61 BPM | BODY MASS INDEX: 39.15 KG/M2 | RESPIRATION RATE: 18 BRPM | OXYGEN SATURATION: 97 %

## 2018-12-03 DIAGNOSIS — F33.1 MODERATE EPISODE OF RECURRENT MAJOR DEPRESSIVE DISORDER (H): ICD-10-CM

## 2018-12-03 DIAGNOSIS — R07.0 THROAT PAIN: ICD-10-CM

## 2018-12-03 DIAGNOSIS — B37.2 CANDIDAL INTERTRIGO: ICD-10-CM

## 2018-12-03 DIAGNOSIS — H66.001 ACUTE SUPPURATIVE OTITIS MEDIA OF RIGHT EAR WITHOUT SPONTANEOUS RUPTURE OF TYMPANIC MEMBRANE, RECURRENCE NOT SPECIFIED: Primary | ICD-10-CM

## 2018-12-03 DIAGNOSIS — J01.00 ACUTE MAXILLARY SINUSITIS, RECURRENCE NOT SPECIFIED: ICD-10-CM

## 2018-12-03 LAB
DEPRECATED S PYO AG THROAT QL EIA: NORMAL
SPECIMEN SOURCE: NORMAL

## 2018-12-03 PROCEDURE — 99214 OFFICE O/P EST MOD 30 MIN: CPT | Performed by: STUDENT IN AN ORGANIZED HEALTH CARE EDUCATION/TRAINING PROGRAM

## 2018-12-03 PROCEDURE — 87880 STREP A ASSAY W/OPTIC: CPT | Performed by: STUDENT IN AN ORGANIZED HEALTH CARE EDUCATION/TRAINING PROGRAM

## 2018-12-03 PROCEDURE — 87081 CULTURE SCREEN ONLY: CPT | Performed by: STUDENT IN AN ORGANIZED HEALTH CARE EDUCATION/TRAINING PROGRAM

## 2018-12-03 RX ORDER — AMOXICILLIN 500 MG/1
500 CAPSULE ORAL 3 TIMES DAILY
Qty: 30 CAPSULE | Refills: 0 | Status: CANCELLED | OUTPATIENT
Start: 2018-12-03

## 2018-12-03 RX ORDER — CITALOPRAM HYDROBROMIDE 10 MG/1
10 TABLET ORAL DAILY
Qty: 90 TABLET | Refills: 1 | Status: SHIPPED | OUTPATIENT
Start: 2018-12-03 | End: 2019-06-26

## 2018-12-03 RX ORDER — KETOCONAZOLE 20 MG/G
CREAM TOPICAL 2 TIMES DAILY
Qty: 60 G | Refills: 3 | Status: SHIPPED | OUTPATIENT
Start: 2018-12-03

## 2018-12-03 ASSESSMENT — PATIENT HEALTH QUESTIONNAIRE - PHQ9
SUM OF ALL RESPONSES TO PHQ QUESTIONS 1-9: 8
10. IF YOU CHECKED OFF ANY PROBLEMS, HOW DIFFICULT HAVE THESE PROBLEMS MADE IT FOR YOU TO DO YOUR WORK, TAKE CARE OF THINGS AT HOME, OR GET ALONG WITH OTHER PEOPLE: NOT DIFFICULT AT ALL
SUM OF ALL RESPONSES TO PHQ QUESTIONS 1-9: 8

## 2018-12-03 NOTE — PROGRESS NOTES
"  SUBJECTIVE:   Lilian Middleton is a 48 year old female who presents to clinic today for the following health issues:      HPI  Acute Illness   Acute illness concerns: Cold  Onset: 1 week    Fever: no    Chills/Sweats: no    Headache (location?): YES    Sinus Pressure:YES- mucopurulent discharge, worse on the right check and jaw    Conjunctivitis:  no    Ear Pain: YES: right    Rhinorrhea: YES    Congestion: YES    Sore Throat: YES     Cough: YES - nonproductive    Wheeze: YES- little    Decreased Appetite: no    Nausea: no    Vomiting: no    Diarrhea:  no    Dysuria/Freq.: no    Fatigue/Achiness: YES    Sick/Strep Exposure: no     Therapies Tried and outcome: water gargles, dayquil, nyquil    Depression -improved and stable.  They have gotten a dog and the dog has taken a liking to her which has improved her mood quite a bit.  She feels that the dog is \"a blessing\" to their family.  She would like to continue to take citalopram 10 mg daily as this has been helpful particularly in helping her to not be so teary.    She would also like a refill of her antifungal cream to use in her abdominal folds and under her breasts for when the rash recurs.  She is not currently having symptoms but would like refills in case she needs it in the future.    Answers for HPI/ROS submitted by the patient on 12/3/2018   If you checked off any problems, how difficult have these problems made it for you to do your work, take care of things at home, or get along with other people?: Not difficult at all  PHQ9 TOTAL SCORE: 8    Problem list and histories reviewed & adjusted, as indicated.  Additional history: as documented    Patient Active Problem List   Diagnosis     Distal radius fracture     Status post wrist surgery     Cervical cancer screening     Cholelithiasis     Epiploic appendagitis     Family history of esophageal cancer     Other psoriasis     Seasonal allergic rhinitis     Exercise-induced asthma     Moderate episode of " "recurrent major depressive disorder (H)     Morbid obesity (H)     Past Surgical History:   Procedure Laterality Date     APPLY EXTERNAL FIXATOR UPPER EXTREMITY Left 10/10/2016    Procedure: APPLY EXTERNAL FIXATOR UPPER EXTREMITY;  Surgeon: Yuli Berrios MD;  Location:  OR       Social History   Substance Use Topics     Smoking status: Never Smoker     Smokeless tobacco: Never Used     Alcohol use No     Family History   Problem Relation Age of Onset     Other Cancer Father          Current Outpatient Prescriptions   Medication Sig Dispense Refill     Acetaminophen (TYLENOL PO) Take 500 mg by mouth as needed for mild pain or fever Reported on 3/8/2017       albuterol (PROAIR HFA/PROVENTIL HFA/VENTOLIN HFA) 108 (90 BASE) MCG/ACT Inhaler Inhale 2 puffs into the lungs every 6 hours as needed for shortness of breath / dyspnea or wheezing 1 Inhaler 5     amoxicillin-clavulanate (AUGMENTIN) 875-125 MG tablet Take 1 tablet by mouth 2 times daily 20 tablet 0     citalopram (CELEXA) 10 MG tablet Take 1 tablet (10 mg) by mouth daily 90 tablet 1     ketoconazole (NIZORAL) 2 % cream Apply topically 2 times daily 60 g 3     No Known Allergies  BP Readings from Last 3 Encounters:   12/03/18 118/80   06/29/18 122/74   04/09/18 (!) 136/94    Wt Readings from Last 3 Encounters:   12/03/18 229 lb 4.8 oz (104 kg)   06/29/18 223 lb 6.4 oz (101.3 kg)   04/09/18 233 lb 8 oz (105.9 kg)               PHQ-9 SCORE 4/9/2018 6/29/2018 12/3/2018   PHQ-9 Total Score MyChart 16 (Moderately severe depression) 10 (Moderate depression) 8 (Mild depression)   PHQ-9 Total Score 16 10 8          Labs reviewed in EPIC    ROS:  Constitutional, HEENT, cardiovascular, pulmonary, gi and gu systems are negative, except as otherwise noted.    OBJECTIVE:     /80  Pulse 61  Temp 98  F (36.7  C)  Resp 18  Ht 5' 4.17\" (1.63 m)  Wt 229 lb 4.8 oz (104 kg)  LMP 11/28/2018  SpO2 97%  BMI 39.15 kg/m2  Body mass index is 39.15 kg/(m^2).  GENERAL: " alert and no acute distress  EYES: Eyes grossly normal to inspection, PERRL and conjunctivae and sclerae normal  HENT: normal cephalic/atraumatic, right ear: erythematous, bulging membrane and mucopurulent effusion, left ear: clear effusion, nasal mucosa edematous , oral mucous membranes moist and sinuses: maxillary tenderness on right, maxillary swelling on right  NECK: no adenopathy, no asymmetry, masses, or scars and thyroid normal to palpation  RESP: lungs clear to auscultation - no rales, rhonchi or wheezes  CV: regular rate and rhythm, normal S1 S2, no S3 or S4, no murmur, click or rub  MS: no gross musculoskeletal defects noted  SKIN: round erythematous abrasion on mid abdomen  NEURO: Normal strength and tone, mentation intact and speech normal  PSYCH: mentation appears normal, affect normal/bright, judgement and insight intact and appearance well groomed    Diagnostic Test Results:  Results for orders placed or performed in visit on 12/03/18   Rapid strep screen   Result Value Ref Range    Specimen Description Throat     Rapid Strep A Screen       NEGATIVE: No Group A streptococcal antigen detected by immunoassay, await culture report.         ASSESSMENT/PLAN:     1. Acute suppurative otitis media of right ear without spontaneous rupture of tympanic membrane, recurrence not specified  2. Acute maxillary sinusitis, recurrence not specified  Treat with antibiotic and follow up if symptoms persist or worsen.  - amoxicillin-clavulanate (AUGMENTIN) 875-125 MG tablet; Take 1 tablet by mouth 2 times daily  Dispense: 20 tablet; Refill: 0    3. Moderate episode of recurrent major depressive disorder (H)  Stable. Continue current medication(s) and dose(s).  - citalopram (CELEXA) 10 MG tablet; Take 1 tablet (10 mg) by mouth daily  Dispense: 90 tablet; Refill: 1    4. Candidal intertrigo  Improved. Refills granted for future if needed.  - ketoconazole (NIZORAL) 2 % external cream; Apply topically 2 times daily   Dispense: 60 g; Refill: 3    5. Throat pain  - Rapid strep screen  - Beta strep group A culture    ZEN Chandler [facility]

## 2018-12-03 NOTE — PATIENT INSTRUCTIONS
Augmentin - take twice daily for 10 days.    I recommend eating yogurt daily while you are on the antibiotic to help restore your good gut bacteria that the antibiotic will decrease this good bacteria.    ELLIOTT TylerC

## 2018-12-03 NOTE — MR AVS SNAPSHOT
"              After Visit Summary   12/3/2018    Lilian Middleton    MRN: [id number]           Patient Information     Date Of Birth          1970        Visit Information        Provider Department      12/3/2018 1:20 PM Evi Ibarra APRN [facility]        Today's Diagnoses     Throat pain    -  1    Acute suppurative otitis media of right ear without spontaneous rupture of tympanic membrane, recurrence not specified        Acute maxillary sinusitis, recurrence not specified          Care Instructions    Augmentin - take twice daily for 10 days.    I recommend eating yogurt daily while you are on the antibiotic to help restore your good gut bacteria that the antibiotic will decrease this good bacteria.    Evi Ibrara, MENG-C            Follow-ups after your visit        Who to contact     If you have questions or need follow up information about today's clinic visit or your schedule please contact [facility] directly at [phone].  Normal or non-critical lab and imaging results will be communicated to you by [employer], letter or phone within 4 business days after the clinic has received the results. If you do not hear from us within 7 days, please contact the clinic through [employer] or phone. If you have a critical or abnormal lab result, we will notify you by phone as soon as possible.  Submit refill requests through [employer] or call your pharmacy and they will forward the refill request to us. Please allow 3 business days for your refill to be completed.          Additional Information About Your Visit        [employer] Information     [employer] lets you send messages to your doctor, view your test results, renew your prescriptions, schedule appointments and more. To sign up, go to [email or web address] . Click on \"Log in\" on the left side of the screen, which will take you to the Welcome page. Then click on \"Sign up Now\" on the right side of the page. " "    You will be asked to enter the access code listed below, as well as some personal information. Please follow the directions to create your username and password.     Your access code is: [email or web address]  Expires: 3/3/2019  1:40 PM     Your access code will  in 90 days. If you need help or a new code, please call your [place] clinic or [phone].        Care EveryWhere ID     This is your Care EveryWhere ID. This could be used by other organizations to access your [place] medical records  [id number]        Your Vitals Were     Pulse Temperature Respirations Height Last Period Pulse Oximetry    61 98  F (36.7  C) 18 5' 4.17\" (1.63 m) 2018 97%    BMI (Body Mass Index)                   39.15 kg/m2            Blood Pressure from Last 3 Encounters:   18 118/80   18 122/74   18 (!) 136/94    Weight from Last 3 Encounters:   18 229 lb 4.8 oz (104 kg)   18 223 lb 6.4 oz (101.3 kg)   18 233 lb 8 oz (105.9 kg)              We Performed the Following     Beta strep group A culture     Rapid strep screen          Today's Medication Changes          These changes are accurate as of 12/3/18  1:40 PM.  If you have any questions, ask your nurse or doctor.               Start taking these medicines.        Dose/Directions    amoxicillin-clavulanate 875-125 MG tablet   Commonly known as:  AUGMENTIN   Used for:  Acute suppurative otitis media of right ear without spontaneous rupture of tympanic membrane, recurrence not specified, Acute maxillary sinusitis, recurrence not specified   Started by:  Evi Ibarra APRN CNP        Dose:  1 tablet   Take 1 tablet by mouth 2 times daily   Quantity:  20 tablet   Refills:  0            Where to get your medicines      These medications were sent to [facility] [place] - [address]  [address] [place] [address], [place] [address]     Phone:  [phone]     " amoxicillin-clavulanate 875-125 MG tablet                Primary Care Provider Office Phone # Fax #    Evi Ibarra, ZEN [phone] [address] [place] [address]        Equal Access to Services     [facility] : Hadii aad ku hadasho Soomaali, waaxda luqadaha, qaybta kaalmada adeegyada, waxay idiin hayaan adeeg khfavian lamaidan . So [facility] [phone].    ATENCIÓN: Si habla español, tiene a drew disposición servicios gratuitos de asistencia lingüística. Llame al [phone].    We comply with applicable federal civil rights laws and [place] laws. We do not discriminate on the basis of race, color, national origin, age, disability, sex, sexual orientation, or gender identity.            Thank you!     Thank you for choosing [facility]  for your care. Our goal is always to provide you with excellent care. Hearing back from our patients is one way we can continue to improve our services. Please take a few minutes to complete the written survey that you may receive in the mail after your visit with us. Thank you!             Your Updated Medication List - Protect others around you: Learn how to safely use, store and throw away your medicines at www.disposemymeds.org.          This list is accurate as of 12/3/18  1:40 PM.  Always use your most recent med list.                   Brand Name Dispense Instructions for use Diagnosis    albuterol 108 (90 Base) MCG/ACT inhaler    PROAIR HFA/PROVENTIL HFA/VENTOLIN HFA    1 Inhaler    Inhale 2 puffs into the lungs every 6 hours as needed for shortness of breath / dyspnea or wheezing    Exercise-induced asthma       amoxicillin-clavulanate 875-125 MG tablet    AUGMENTIN    20 tablet    Take 1 tablet by mouth 2 times daily    Acute suppurative otitis media of right ear without spontaneous rupture of tympanic membrane, recurrence not specified, Acute maxillary sinusitis, recurrence not specified       citalopram 10 MG  tablet    celeXA    90 tablet    Take 1 tablet (10 mg) by mouth daily    Moderate episode of recurrent major depressive disorder (H)       etonogestrel-ethinyl estradiol 0.12-0.015 MG/24HR vaginal ring    NUVARING    1 each    Insert 1 ring vaginally every 21 days then remove for 1 week then repeat with new ring.    Encounter for initial prescription of vaginal ring hormonal contraceptive       ketoconazole 2 % external cream    NIZORAL    60 g    Apply topically 2 times daily    Candidal intertrigo       TYLENOL PO      Take 500 mg by mouth as needed for mild pain or fever Reported on 3/8/2017

## 2018-12-04 LAB
BACTERIA SPEC CULT: NORMAL
SPECIMEN SOURCE: NORMAL

## 2018-12-04 ASSESSMENT — ASTHMA QUESTIONNAIRES: ACT_TOTALSCORE: 21

## 2019-06-26 DIAGNOSIS — F33.1 MODERATE EPISODE OF RECURRENT MAJOR DEPRESSIVE DISORDER (H): ICD-10-CM

## 2019-06-26 RX ORDER — CITALOPRAM HYDROBROMIDE 10 MG/1
10 TABLET ORAL DAILY
Qty: 30 TABLET | Refills: 0 | Status: SHIPPED | OUTPATIENT
Start: 2019-06-26 | End: 2019-08-12

## 2019-06-26 NOTE — TELEPHONE ENCOUNTER
Medication is being filled for 1 time refill only due to:  Patient needs to be seen because due for mood follow up.   No future office visit scheduled.   Please call and help schedule.  Thank you!  Eduardo Cantrell, RN, BSN

## 2019-06-27 NOTE — TELEPHONE ENCOUNTER
Notified patient of message below, she will check her work schedule and call back to set up an appointment.

## 2019-08-09 NOTE — PROGRESS NOTES
"Subjective     Lilian Middleton is a 49 year old female who presents to clinic today for the following health issues:    History of Present Illness        Mental Health Follow-up:  Patient presents to follow-up on Depression & Anxiety.Patient's depression since last visit has been:  Medium  The patient is having other symptoms associated with depression.  Patient's anxiety since last visit has been:  Medium  The patient is having other symptoms associated with anxiety.  Any significant life events: relationship concerns  Patient is feeling anxious or having panic attacks.  Patient has no concerns about alcohol or drug use.     Social History  Tobacco Use    Smoking status: Never Smoker    Smokeless tobacco: Never Used  Alcohol use: No    Alcohol/week: 0.0 oz  Drug use: No      Today's PHQ-9         PHQ-9 Total Score:     (P) 9   PHQ-9 Q9 Thoughts of better off dead/self-harm past 2 weeks :   (P) Several days   Thoughts of suicide or self harm:  (P) No   Self-harm Plan:        Self-harm Action:          Safety concerns for self or others: (P) No         Migraines:   Since the patient's last clinic visit, headaches are: no change  The patient is getting headaches:  2  She is not able to do normal daily activities when she has a migraine.  The patient is taking the following rescue/relief medications:  Naproxyn (Aleve)   Patient states \"I get some relief\" from the rescue/relief medications.   The patient is taking the following medications to prevent migraines:  No medications to prevent migraines  In the past 4 weeks, the patient has gone to an Urgent Care or Emergency Room 0 times times due to headaches.    She eats 0-1 servings of fruits and vegetables daily.She consumes 3 sweetened beverage(s) daily.She is missing 2 dose(s) of medications per week.  She is not taking prescribed medications regularly due to remembering to take and other.     She has been on citalopram 10 mg daily.  She admits that she does not " always take this consistently and notices when she misses a couple days she feels worse.  She thinks if she takes the medication on a daily basis that overall she will feel better in terms of her depression and anxiety.  She is having some stress with her marriage.  Her  is an alcoholic and she has a hard time talking to him about her concerns with the marriage as she is worried that he will be more verbally abusive.  He is not physically abusive and she feels safe.  She is thinking at some point she would like to divorce him as she states she has not them up with him anymore.  She is worried about  him as she wonders what effect it will have on their adult daughter who lives with them.  She has occasional suicidal thoughts of pop and her head but states that she has no intent or plan to harm herself.  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  She has been differing shifts which sometimes makes it difficult to sleep at night.  She is considering switching to a bakery position closer to home that is all morning shifts.  She states that she is not much of a morning person but thinks that she may be able to get into a habit if it was consistent shift time.    Patient would like to discuss irregular periods.  Her last menstrual cycle started last Wednesday with bleeding followed by brown spotting towards the end of her menses.  Her periods last typically 5 to 7 days which has not changed.  She continues to have cramping with her periods which has not changed.    Last Friday she was not feeling well in general.  She is wondering if it was because of the change in weather and her allergies which seem to worsen how she feels when the weather changes.  She is also feeling exhausted.  She felt like she had the chills and just wanted to sleep.  Over the weekend she rested and slept a lot and today is feeling better.    Answers for HPI/ROS submitted by the patient on  8/12/2019   Chronic problems general questions HPI Form  PHQ9 TOTAL SCORE: 9  MIKE 7 TOTAL SCORE: 7    Patient Active Problem List   Diagnosis     Distal radius fracture     Status post wrist surgery     Cervical cancer screening     Cholelithiasis     Epiploic appendagitis     Family history of esophageal cancer     Other psoriasis     Seasonal allergic rhinitis     Exercise-induced asthma     Moderate episode of recurrent major depressive disorder (H)     Morbid obesity (H)     Past Surgical History:   Procedure Laterality Date     APPLY EXTERNAL FIXATOR UPPER EXTREMITY Left 10/10/2016    Procedure: APPLY EXTERNAL FIXATOR UPPER EXTREMITY;  Surgeon: Yuli Berrios MD;  Location:  OR       Social History     Tobacco Use     Smoking status: Never Smoker     Smokeless tobacco: Never Used   Substance Use Topics     Alcohol use: No     Alcohol/week: 0.0 oz     Family History   Problem Relation Age of Onset     Other Cancer Father          Current Outpatient Medications   Medication Sig Dispense Refill     albuterol (PROAIR HFA/PROVENTIL HFA/VENTOLIN HFA) 108 (90 BASE) MCG/ACT Inhaler Inhale 2 puffs into the lungs every 6 hours as needed for shortness of breath / dyspnea or wheezing 1 Inhaler 5     citalopram (CELEXA) 10 MG tablet Take 1 tablet (10 mg) by mouth daily 90 tablet 3     ketoconazole (NIZORAL) 2 % external cream Apply topically 2 times daily 60 g 3     triamcinolone (KENALOG) 0.1 % external cream Apply topically 2 times daily 80 g 1     No Known Allergies  BP Readings from Last 3 Encounters:   08/12/19 132/84   12/03/18 118/80   06/29/18 122/74    Wt Readings from Last 3 Encounters:   08/12/19 101.2 kg (223 lb 3.2 oz)   12/03/18 104 kg (229 lb 4.8 oz)   06/29/18 101.3 kg (223 lb 6.4 oz)                    Reviewed and updated as needed this visit by Provider         Review of Systems   ROS COMP: Constitutional, HEENT, cardiovascular, pulmonary, GI, , musculoskeletal, neuro, skin, endocrine and psych  "systems are negative, except as otherwise noted.      Objective    /84   Pulse 67   Temp 97.2  F (36.2  C) (Temporal)   Resp 16   Ht 1.615 m (5' 3.58\")   Wt 101.2 kg (223 lb 3.2 oz)   SpO2 98%   BMI 38.82 kg/m    Body mass index is 38.82 kg/m .  Physical Exam   GENERAL: alert, no distress and obese  RESP: lungs clear to auscultation - no rales, rhonchi or wheezes  CV: regular rate and rhythm, normal S1 S2, no S3 or S4, no murmur, click or rub, no peripheral edema and peripheral pulses strong  MS: no gross musculoskeletal defects noted, no edema  SKIN: Scattered healing singular lesions, some with scabs some with localized irritation and excoriations.  NEURO: Normal strength and tone, mentation intact and speech normal  PSYCH: mentation appears normal, tearful, anxious, judgement and insight intact and appearance well groomed    Diagnostic Test Results:  Labs reviewed in Epic        Assessment & Plan     1. Moderate episode of recurrent major depressive disorder (H)  Mild worsening with situational stressors.  Patient has not been consistent with taking citalopram daily but notes that when she does take it consistently that she feels much better.  We discussed increase in dose but shared decision making to keep it at the same dose and she will work on taking it consistently.  Follow-up in 6-12 months or sooner if needed.  - citalopram (CELEXA) 10 MG tablet; Take 1 tablet (10 mg) by mouth daily  Dispense: 90 tablet; Refill: 3    2. Dermatitis  Patient has bug bites that are causing her to itch as they heal.  She will scratch the scabs off which is delaying healing.  I prescribed topical steroid to reduce itching and likely promote quicker healing.  - triamcinolone (KENALOG) 0.1 % external cream; Apply topically 2 times daily  Dispense: 80 g; Refill: 1    3. Morbid obesity (H)  Recommend increasing exercise, healthier eating with goal of weight loss.    4. Migraine without aura and without status " "migrainosus, not intractable  Stable.  On no prescription medications.    5. Perimenopausal symptoms  Latest.  Was later and some brownish spotting towards the end.  Discussed that most likely due to perimenopause and discussed that she can anticipate greater regularity and periods that she goes through perimenopause.  May also experience increase in irritability, hot flashes, etc.    6. Encounter for screening mammogram for breast cancer  - *MA Screening Digital Bilateral; Future     BMI:   Estimated body mass index is 38.82 kg/m  as calculated from the following:    Height as of this encounter: 1.615 m (5' 3.58\").    Weight as of this encounter: 101.2 kg (223 lb 3.2 oz).   Weight management plan: Discussed healthy diet and exercise guidelines    No follow-ups on file.    ZEN Chandler [facility]    "

## 2019-08-12 ENCOUNTER — OFFICE VISIT (OUTPATIENT)
Dept: FAMILY MEDICINE | Facility: OTHER | Age: 49
End: 2019-08-12
Payer: COMMERCIAL

## 2019-08-12 VITALS
HEART RATE: 67 BPM | WEIGHT: 223.2 LBS | RESPIRATION RATE: 16 BRPM | TEMPERATURE: 97.2 F | BODY MASS INDEX: 38.1 KG/M2 | OXYGEN SATURATION: 98 % | HEIGHT: 64 IN | SYSTOLIC BLOOD PRESSURE: 132 MMHG | DIASTOLIC BLOOD PRESSURE: 84 MMHG

## 2019-08-12 DIAGNOSIS — N95.1 PERIMENOPAUSAL SYMPTOMS: ICD-10-CM

## 2019-08-12 DIAGNOSIS — F33.1 MODERATE EPISODE OF RECURRENT MAJOR DEPRESSIVE DISORDER (H): Primary | ICD-10-CM

## 2019-08-12 DIAGNOSIS — L30.9 DERMATITIS: ICD-10-CM

## 2019-08-12 DIAGNOSIS — G43.009 MIGRAINE WITHOUT AURA AND WITHOUT STATUS MIGRAINOSUS, NOT INTRACTABLE: ICD-10-CM

## 2019-08-12 DIAGNOSIS — Z12.31 ENCOUNTER FOR SCREENING MAMMOGRAM FOR BREAST CANCER: ICD-10-CM

## 2019-08-12 DIAGNOSIS — E66.01 MORBID OBESITY (H): ICD-10-CM

## 2019-08-12 PROCEDURE — 99214 OFFICE O/P EST MOD 30 MIN: CPT | Performed by: STUDENT IN AN ORGANIZED HEALTH CARE EDUCATION/TRAINING PROGRAM

## 2019-08-12 RX ORDER — TRIAMCINOLONE ACETONIDE 1 MG/G
CREAM TOPICAL 2 TIMES DAILY
Qty: 80 G | Refills: 1 | Status: SHIPPED | OUTPATIENT
Start: 2019-08-12

## 2019-08-12 RX ORDER — CITALOPRAM HYDROBROMIDE 10 MG/1
10 TABLET ORAL DAILY
Qty: 90 TABLET | Refills: 3 | Status: SHIPPED | OUTPATIENT
Start: 2019-08-12 | End: 2020-08-17

## 2019-08-12 ASSESSMENT — MIFFLIN-ST. JEOR: SCORE: 1615.81

## 2019-08-12 ASSESSMENT — ANXIETY QUESTIONNAIRES
GAD7 TOTAL SCORE: 7
1. FEELING NERVOUS, ANXIOUS, OR ON EDGE: SEVERAL DAYS
6. BECOMING EASILY ANNOYED OR IRRITABLE: SEVERAL DAYS
7. FEELING AFRAID AS IF SOMETHING AWFUL MIGHT HAPPEN: SEVERAL DAYS
2. NOT BEING ABLE TO STOP OR CONTROL WORRYING: SEVERAL DAYS
7. FEELING AFRAID AS IF SOMETHING AWFUL MIGHT HAPPEN: SEVERAL DAYS
4. TROUBLE RELAXING: SEVERAL DAYS
3. WORRYING TOO MUCH ABOUT DIFFERENT THINGS: SEVERAL DAYS
5. BEING SO RESTLESS THAT IT IS HARD TO SIT STILL: SEVERAL DAYS
GAD7 TOTAL SCORE: 7
GAD7 TOTAL SCORE: 7

## 2019-08-12 ASSESSMENT — PATIENT HEALTH QUESTIONNAIRE - PHQ9
SUM OF ALL RESPONSES TO PHQ QUESTIONS 1-9: 9
SUM OF ALL RESPONSES TO PHQ QUESTIONS 1-9: 9

## 2019-08-12 NOTE — LETTER
My Asthma Action Plan  Name: Lilian Middleton   YOB: 1970  Date: 8/12/2019   My doctor: ZEN Chandler CNP   My clinic: [facility]        My Control Medicine: None  My Rescue Medicine: Albuterol (Proair/Ventolin/Proventil) inhaler PRN   My Asthma Severity: intermittent  Avoid your asthma triggers: exercise or sports               GREEN ZONE   Good Control    I feel good    No cough or wheeze    Can work, sleep and play without asthma symptoms       Take your asthma control medicine every day.     1. If exercise triggers your asthma, take your rescue medication    15 minutes before exercise or sports, and    During exercise if you have asthma symptoms  2. Spacer to use with inhaler: If you have a spacer, make sure to use it with your inhaler             YELLOW ZONE Getting Worse  I have ANY of these:    I do not feel good    Cough or wheeze    Chest feels tight    Wake up at night   1. Keep taking your Green Zone medications  2. Start taking your rescue medicine:    every 20 minutes for up to 1 hour. Then every 4 hours for 24-48 hours.  3. If you stay in the Yellow Zone for more than 12-24 hours, contact your doctor.  4. If you do not return to the Green Zone in 12-24 hours or you get worse, start taking your oral steroid medicine if prescribed by your provider.           RED ZONE Medical Alert - Get Help  I have ANY of these:    I feel awful    Medicine is not helping    Breathing getting harder    Trouble walking or talking    Nose opens wide to breathe       1. Take your rescue medicine NOW  2. If your provider has prescribed an oral steroid medicine, start taking it NOW  3. Call your doctor NOW  4. If you are still in the Red Zone after 20 minutes and you have not reached your doctor:    Take your rescue medicine again and    Call 911 or go to the emergency room right away    See your regular doctor within 2 weeks of an Emergency Room or Urgent Care visit for follow-up  treatment.          Annual Reminders:  Meet with Asthma Educator,  Flu Shot in the Fall, consider Pneumonia Vaccination for patients with asthma (aged 19 and older).    Pharmacy: [facility] [place] - [address] [address]                      Asthma Triggers  How To Control Things That Make Your Asthma Worse    Triggers are things that make your asthma worse.  Look at the list below to help you find your triggers and what you can do about them.  You can help prevent asthma flare-ups by staying away from your triggers.      Trigger                                                          What you can do   Cigarette Smoke  Tobacco smoke can make asthma worse. Do not allow smoking in your home, car or around you.  Be sure no one smokes at a child s day care or school.  If you smoke, ask your health care provider for ways to help you quit.  Ask family members to quit too.  Ask your health care provider for a referral to Quit Plan to help you quit smoking, or call 0-163-374-PLAN.     Colds, Flu, Bronchitis  These are common triggers of asthma. Wash your hands often.  Don t touch your eyes, nose or mouth.  Get a flu shot every year.     Dust Mites  These are tiny bugs that live in cloth or carpet. They are too small to see. Wash sheets and blankets in hot water every week.   Encase pillows and mattress in dust mite proof covers.  Avoid having carpet if you can. If you have carpet, vacuum weekly.   Use a dust mask and HEPA vacuum.   Pollen and Outdoor Mold  Some people are allergic to trees, grass, or weed pollen, or molds. Try to keep your windows closed.  Limit time out doors when pollen count is high.   Ask you health care provider about taking medicine during allergy season.     Animal Dander  Some people are allergic to skin flakes, urine or saliva from pets with fur or feathers. Keep pets with fur or feathers out of your home.    If you can t keep the pet outdoors, then keep the  pet out of your bedroom.  Keep the bedroom door closed.  Keep pets off cloth furniture and away from stuffed toys.     Mice, Rats, and Cockroaches  Some people are allergic to the waste from these pests.   Cover food and garbage.  Clean up spills and food crumbs.  Store grease in the refrigerator.   Keep food out of the bedroom.   Indoor Mold  This can be a trigger if your home has high moisture. Fix leaking faucets, pipes, or other sources of water.   Clean moldy surfaces.  Dehumidify basement if it is damp and smelly.   Smoke, Strong Odors, and Sprays  These can reduce air quality. Stay away from strong odors and sprays, such as perfume, powder, hair spray, paints, smoke incense, paint, cleaning products, candles and new carpet.   Exercise or Sports  Some people with asthma have this trigger. Be active!  Ask your doctor about taking medicine before sports or exercise to prevent symptoms.    Warm up for 5-10 minutes before and after sports or exercise.     Other Triggers of Asthma  Cold air:  Cover your nose and mouth with a scarf.  Sometimes laughing or crying can be a trigger.  Some medicines and food can trigger asthma.

## 2019-08-13 ASSESSMENT — ASTHMA QUESTIONNAIRES: ACT_TOTALSCORE: 20

## 2019-08-13 ASSESSMENT — ANXIETY QUESTIONNAIRES: GAD7 TOTAL SCORE: 7

## 2019-08-13 ASSESSMENT — PATIENT HEALTH QUESTIONNAIRE - PHQ9: SUM OF ALL RESPONSES TO PHQ QUESTIONS 1-9: 9

## 2019-08-21 ENCOUNTER — TELEPHONE (OUTPATIENT)
Dept: FAMILY MEDICINE | Facility: OTHER | Age: 49
End: 2019-08-21

## 2019-08-21 PROBLEM — G43.009 MIGRAINE WITHOUT AURA AND WITHOUT STATUS MIGRAINOSUS, NOT INTRACTABLE: Status: ACTIVE | Noted: 2019-08-21

## 2019-08-21 NOTE — TELEPHONE ENCOUNTER
Patient is due/failing the following:   none    Action needed:   Routed to provider for review.    Type of outreach:    None, routed to provider for review.    Questions for provider review:    Patient has migraine and does not have migraine on problem list. Please add migraine to problem list if appropriate.      Panel Management Review      Patient has the following on her problem list:     Depression / Dysthymia review    Measure:  Needs PHQ-9 score of 4 or less during index window.  Administer PHQ-9 and if score is 5 or more, send encounter to provider for next steps.    5 - 7 month window range:     PHQ-9 SCORE 6/29/2018 12/3/2018 8/12/2019   PHQ-9 Total Score MyChart 10 (Moderate depression) 8 (Mild depression) 9 (Mild depression)   PHQ-9 Total Score 10 8 9       If PHQ-9 recheck is 5 or more, route to provider for next steps.    Patient is due for:  None    Asthma review     ACT Total Scores 8/12/2019   ACT TOTAL SCORE (Goal Greater than or Equal to 20) 20   In the past 12 months, how many times did you visit the emergency room for your asthma without being admitted to the hospital? 0   In the past 12 months, how many times were you hospitalized overnight because of your asthma? 0      1. Is Asthma diagnosis on the Problem List? Yes    2. Is Asthma listed on Health Maintenance? Yes    3. Patient is due for:  none      Composite cancer screening  Chart review shows that this patient is due/due soon for the following None  Summary:    Patient is due/failing the following:   none    Action needed:   Routed to provider for review.    Type of outreach:    None, routed to provider for review.    Questions for provider review:    Patient has migraine and does not have migraine on problem list. Please add migraine to problem list if appropriate.                                                                                                                                    Rossi Funes [facility]     Chart routed to  Provider .

## 2019-08-22 ENCOUNTER — ANCILLARY PROCEDURE (OUTPATIENT)
Dept: MAMMOGRAPHY | Facility: OTHER | Age: 49
End: 2019-08-22
Attending: STUDENT IN AN ORGANIZED HEALTH CARE EDUCATION/TRAINING PROGRAM
Payer: COMMERCIAL

## 2019-08-22 DIAGNOSIS — Z12.31 ENCOUNTER FOR SCREENING MAMMOGRAM FOR BREAST CANCER: ICD-10-CM

## 2019-08-22 PROCEDURE — 77063 BREAST TOMOSYNTHESIS BI: CPT | Mod: TC

## 2019-08-22 PROCEDURE — 77067 SCR MAMMO BI INCL CAD: CPT | Mod: TC

## 2019-11-15 NOTE — PROGRESS NOTES
Subjective     Lilian Middleton is a 49 year old female who presents to clinic today for the following health issues:    HPI   Acute Illness   Acute illness concerns: Ear pain  Onset: Couple weeks    Fever: no    Chills/Sweats: no    Headache (location?): YES    Sinus Pressure:YES- post-nasal drainage, facial pain and mucopurulent discharge    Conjunctivitis:  no    Ear Pain: YES: both    Rhinorrhea: YES    Congestion: YES    Sore Throat: YES- little sore today, but only in the mornings     Cough: YES - once in a while    Wheeze: no    Decreased Appetite: no    Nausea: YES- little     Vomiting: no    Diarrhea:  no    Dysuria/Freq.: no    Fatigue/Achiness: YES    Sick/Strep Exposure: no     Therapies Tried and outcome: None      Patient Active Problem List   Diagnosis     Distal radius fracture     Status post wrist surgery     Cervical cancer screening     Cholelithiasis     Epiploic appendagitis     Family history of esophageal cancer     Other psoriasis     Seasonal allergic rhinitis     Exercise-induced asthma     Moderate episode of recurrent major depressive disorder (H)     Morbid obesity (H)     Migraine without aura and without status migrainosus, not intractable     Past Surgical History:   Procedure Laterality Date     APPLY EXTERNAL FIXATOR UPPER EXTREMITY Left 10/10/2016    Procedure: APPLY EXTERNAL FIXATOR UPPER EXTREMITY;  Surgeon: Yuli Berrios MD;  Location: [facility]       Social History     Tobacco Use     Smoking status: Never Smoker     Smokeless tobacco: Never Used   Substance Use Topics     Alcohol use: No     Alcohol/week: 0.0 standard drinks     Family History   Problem Relation Age of Onset     Other Cancer Father          Current Outpatient Medications   Medication Sig Dispense Refill     albuterol (PROAIR HFA/PROVENTIL HFA/VENTOLIN HFA) 108 (90 BASE) MCG/ACT Inhaler Inhale 2 puffs into the lungs every 6 hours as needed for shortness of breath / dyspnea or wheezing 1 Inhaler 5      "amoxicillin-clavulanate (AUGMENTIN) 875-125 MG tablet Take 1 tablet by mouth 2 times daily for 10 days 20 tablet 0     citalopram (CELEXA) 10 MG tablet Take 1 tablet (10 mg) by mouth daily 90 tablet 3     ketoconazole (NIZORAL) 2 % external cream Apply topically 2 times daily 60 g 3     triamcinolone (KENALOG) 0.1 % external cream Apply topically 2 times daily 80 g 1     No Known Allergies  BP Readings from Last 3 Encounters:   11/19/19 114/76   08/12/19 132/84   12/03/18 118/80    Wt Readings from Last 3 Encounters:   11/19/19 99.6 kg (219 lb 8 oz)   08/12/19 101.2 kg (223 lb 3.2 oz)   12/03/18 104 kg (229 lb 4.8 oz)                    Reviewed and updated as needed this visit by Provider         Review of Systems   ROS COMP: Constitutional, HEENT, cardiovascular, pulmonary, gi and gu systems are negative, except as otherwise noted.      Objective    /76   Pulse 73   Temp 98  F (36.7  C) (Temporal)   Resp 12   Ht 1.615 m (5' 3.58\")   Wt 99.6 kg (219 lb 8 oz)   SpO2 97%   BMI 38.17 kg/m    Body mass index is 38.17 kg/m .  Physical Exam   GENERAL: alert, no acute distress and obese  EYES: Eyes grossly normal to inspection, PERRL and conjunctivae and sclerae normal  HENT: ear canals normal, right TM with serous effusion, left TM with clear effusion, bilateral maxillary sinuses tender to palpation, nose and mouth without ulcers or lesions  NECK: no adenopathy, no asymmetry, masses, or scars and thyroid normal to palpation  RESP: lungs clear to auscultation - no rales, rhonchi or wheezes  CV: regular rate and rhythm, normal S1 S2, no S3 or S4, no murmur, click or rub  MS: no gross musculoskeletal defects noted, no edema  SKIN: no suspicious lesions or rashes  NEURO: Normal strength and tone, mentation intact and speech normal    Diagnostic Test Results:  Labs reviewed in Epic        Assessment & Plan     1. Acute non-recurrent maxillary sinusitis  Return to clinic if symptoms persist or fail to improve.   - " "amoxicillin-clavulanate (AUGMENTIN) 875-125 MG tablet; Take 1 tablet by mouth 2 times daily for 10 days  Dispense: 20 tablet; Refill: 0     BMI:   Estimated body mass index is 38.17 kg/m  as calculated from the following:    Height as of this encounter: 1.615 m (5' 3.58\").    Weight as of this encounter: 99.6 kg (219 lb 8 oz).   Weight management plan: Discussed healthy diet and exercise guidelines    No follow-ups on file.    ZEN Chandler [facility]    "

## 2019-11-19 ENCOUNTER — OFFICE VISIT (OUTPATIENT)
Dept: FAMILY MEDICINE | Facility: OTHER | Age: 49
End: 2019-11-19
Payer: COMMERCIAL

## 2019-11-19 VITALS
HEART RATE: 73 BPM | RESPIRATION RATE: 12 BRPM | HEIGHT: 64 IN | OXYGEN SATURATION: 97 % | SYSTOLIC BLOOD PRESSURE: 114 MMHG | DIASTOLIC BLOOD PRESSURE: 76 MMHG | TEMPERATURE: 98 F | BODY MASS INDEX: 37.47 KG/M2 | WEIGHT: 219.5 LBS

## 2019-11-19 DIAGNOSIS — J01.00 ACUTE NON-RECURRENT MAXILLARY SINUSITIS: Primary | ICD-10-CM

## 2019-11-19 PROCEDURE — 99213 OFFICE O/P EST LOW 20 MIN: CPT | Performed by: STUDENT IN AN ORGANIZED HEALTH CARE EDUCATION/TRAINING PROGRAM

## 2019-11-19 ASSESSMENT — MIFFLIN-ST. JEOR: SCORE: 1599.03

## 2019-11-19 NOTE — PATIENT INSTRUCTIONS
For sinus infection start Augmentin twice daily for 10 days.    Okay to try Advil cold and sinus for ear and sinus pressure and pain.    Follow up if the symptoms worsen or persist after finishing the antibiotic.    Evi Ibarra NP-C

## 2020-03-02 NOTE — PROGRESS NOTES
Subjective     Lilian Middleton is a 49 year old female who presents to clinic today for the following health issues:    History of Present Illness        She eats 0-1 servings of fruits and vegetables daily.She consumes 2 sweetened beverage(s) daily.She exercises with enough effort to increase her heart rate 10 to 19 minutes per day.  She exercises with enough effort to increase her heart rate 3 or less days per week.   She is taking medications regularly.     Acute Illness   Acute illness concerns: ear pain, headache  Onset: couple weeks    Fever: no     Chills/Sweats: YES- occasionally    Headache (location?): YES    Sinus Pressure:YES    Conjunctivitis:  no    Ear Pain: YES- right    Rhinorrhea: YES    Congestion: no     Sore Throat: YES- occasion     Cough: YES    Wheeze: no     Decreased Appetite: no     Nausea: YES- last week    Vomiting: no     Diarrhea:  no     Dysuria/Freq.: no     Fatigue/Achiness: YES    Sick/Strep Exposure: no      Therapies Tried and outcome: Vicks, dayquil/nyquil- none       Patient Active Problem List   Diagnosis     Distal radius fracture     Status post wrist surgery     Cervical cancer screening     Cholelithiasis     Epiploic appendagitis     Family history of esophageal cancer     Other psoriasis     Seasonal allergic rhinitis     Exercise-induced asthma     Moderate episode of recurrent major depressive disorder (H)     Morbid obesity (H)     Migraine without aura and without status migrainosus, not intractable     Past Surgical History:   Procedure Laterality Date     APPLY EXTERNAL FIXATOR UPPER EXTREMITY Left 10/10/2016    Procedure: APPLY EXTERNAL FIXATOR UPPER EXTREMITY;  Surgeon: Yuli Berrios MD;  Location: [facility] OR       Social History     Tobacco Use     Smoking status: Never Smoker     Smokeless tobacco: Never Used   Substance Use Topics     Alcohol use: No     Alcohol/week: 0.0 standard drinks     Family History   Problem Relation Age of Onset     Other Cancer  Father          Current Outpatient Medications   Medication Sig Dispense Refill     amoxicillin-clavulanate (AUGMENTIN) 875-125 MG tablet Take 1 tablet by mouth 2 times daily for 10 days 20 tablet 0     citalopram (CELEXA) 10 MG tablet Take 1 tablet (10 mg) by mouth daily 90 tablet 3     albuterol (PROAIR HFA/PROVENTIL HFA/VENTOLIN HFA) 108 (90 BASE) MCG/ACT Inhaler Inhale 2 puffs into the lungs every 6 hours as needed for shortness of breath / dyspnea or wheezing (Patient not taking: Reported on 3/3/2020) 1 Inhaler 5     ketoconazole (NIZORAL) 2 % external cream Apply topically 2 times daily (Patient not taking: Reported on 3/3/2020) 60 g 3     triamcinolone (KENALOG) 0.1 % external cream Apply topically 2 times daily (Patient not taking: Reported on 3/3/2020) 80 g 1     No Known Allergies  BP Readings from Last 3 Encounters:   03/03/20 104/62   11/19/19 114/76   08/12/19 132/84    Wt Readings from Last 3 Encounters:   03/03/20 101.8 kg (224 lb 6.4 oz)   11/19/19 99.6 kg (219 lb 8 oz)   08/12/19 101.2 kg (223 lb 3.2 oz)                    Reviewed and updated as needed this visit by Provider         Review of Systems   ROS COMP: Constitutional, HEENT, cardiovascular, pulmonary, gi and gu systems are negative, except as otherwise noted.      Objective    /62   Pulse 68   Temp 96.7  F (35.9  C) (Temporal)   Resp 16   Wt 101.8 kg (224 lb 6.4 oz)   BMI 39.03 kg/m    Body mass index is 39.03 kg/m .  Physical Exam   GENERAL: healthy, alert and no distress  EYES: Eyes grossly normal to inspection, PERRL and conjunctivae and sclerae normal  HENT: normal cephalic/atraumatic, both ears: clear effusion, nasal mucosa edematous , oropharynx clear, oral mucous membranes moist and sinuses: maxillary tenderness on bilaterally  NECK: no adenopathy, no asymmetry, masses, or scars   RESP: lungs clear to auscultation - no rales, rhonchi or wheezes  CV: regular rate and rhythm, normal S1 S2, no S3 or S4, no murmur, click or  rub  MS: no gross musculoskeletal defects noted  SKIN: no suspicious lesions or rashes  NEURO: Normal strength and tone, mentation intact and speech normal  PSYCH: mentation appears normal, affect normal/bright    Diagnostic Test Results:  Labs reviewed in Epic        Assessment & Plan     1. Acute non-recurrent maxillary sinusitis  Treat for symptoms of sinus infection. Follow up if symptoms persist or worsen by phone. She feels the last antibiotic course she took in November may have not fully resolved her symptoms. I told her to call if she continues to have symptoms at the end of the course of antibiotic and we could extend the course by phone if needed.   - amoxicillin-clavulanate (AUGMENTIN) 875-125 MG tablet; Take 1 tablet by mouth 2 times daily for 10 days  Dispense: 20 tablet; Refill: 0       No follow-ups on file.    [facility]    Answers for HPI/ROS submitted by the patient on 3/3/2020   Chronic problems general questions HPI Form  If you checked off any problems, how difficult have these problems made it for you to do your work, take care of things at home, or get along with other people?: Somewhat difficult  PHQ9 TOTAL SCORE: 12    Answers for HPI/ROS submitted by the patient on 3/3/2020   Chronic problems general questions HPI Form  If you checked off any problems, how difficult have these problems made it for you to do your work, take care of things at home, or get along with other people?: Somewhat difficult  PHQ9 TOTAL SCORE: 12

## 2020-03-03 ENCOUNTER — OFFICE VISIT (OUTPATIENT)
Dept: FAMILY MEDICINE | Facility: OTHER | Age: 50
End: 2020-03-03
Payer: COMMERCIAL

## 2020-03-03 VITALS
DIASTOLIC BLOOD PRESSURE: 62 MMHG | RESPIRATION RATE: 16 BRPM | BODY MASS INDEX: 39.03 KG/M2 | WEIGHT: 224.4 LBS | TEMPERATURE: 96.7 F | HEART RATE: 68 BPM | SYSTOLIC BLOOD PRESSURE: 104 MMHG

## 2020-03-03 DIAGNOSIS — J01.00 ACUTE NON-RECURRENT MAXILLARY SINUSITIS: Primary | ICD-10-CM

## 2020-03-03 PROCEDURE — 99213 OFFICE O/P EST LOW 20 MIN: CPT | Performed by: STUDENT IN AN ORGANIZED HEALTH CARE EDUCATION/TRAINING PROGRAM

## 2020-03-03 ASSESSMENT — PATIENT HEALTH QUESTIONNAIRE - PHQ9
SUM OF ALL RESPONSES TO PHQ QUESTIONS 1-9: 12
SUM OF ALL RESPONSES TO PHQ QUESTIONS 1-9: 12
10. IF YOU CHECKED OFF ANY PROBLEMS, HOW DIFFICULT HAVE THESE PROBLEMS MADE IT FOR YOU TO DO YOUR WORK, TAKE CARE OF THINGS AT HOME, OR GET ALONG WITH OTHER PEOPLE: SOMEWHAT DIFFICULT

## 2020-03-03 ASSESSMENT — PAIN SCALES - GENERAL: PAINLEVEL: MILD PAIN (3)

## 2020-03-03 NOTE — PATIENT INSTRUCTIONS
Start the Augmentin twice daily for 10 days.    Follow up if the symptoms persist or worsen.    ELLIOTT TylerC

## 2020-03-04 ASSESSMENT — ASTHMA QUESTIONNAIRES: ACT_TOTALSCORE: 22

## 2020-08-15 ENCOUNTER — TELEPHONE (OUTPATIENT)
Dept: FAMILY MEDICINE | Facility: OTHER | Age: 50
End: 2020-08-15

## 2020-08-15 DIAGNOSIS — F33.1 MODERATE EPISODE OF RECURRENT MAJOR DEPRESSIVE DISORDER (H): ICD-10-CM

## 2020-08-17 RX ORDER — CITALOPRAM HYDROBROMIDE 10 MG/1
10 TABLET ORAL DAILY
Qty: 30 TABLET | Refills: 0 | Status: SHIPPED | OUTPATIENT
Start: 2020-08-17 | End: 2024-05-17

## 2020-08-17 NOTE — TELEPHONE ENCOUNTER
Pending Prescriptions:                       Disp   Refills    citalopram (CELEXA) 10 MG tablet [Pharmacy*90 tab*3        Sig: Take 1 tablet (10 mg) by mouth daily    Routing refill request to provider for review/approval because:  Labs out of range/not current:  PHQ-9    Ofelia Nickerson, BSN, RN, PHN

## 2020-08-18 NOTE — TELEPHONE ENCOUNTER
Due for follow up on mood and refills of citalopram. I sent in a 30 day prescription to get her through until she can be seen. Please call to schedule appointment.  Deyanira Ibarra, CNP

## 2020-08-24 ENCOUNTER — APPOINTMENT (OUTPATIENT)
Dept: GENERAL RADIOLOGY | Facility: CLINIC | Age: 50
End: 2020-08-24
Attending: EMERGENCY MEDICINE
Payer: COMMERCIAL

## 2020-08-24 ENCOUNTER — NURSE TRIAGE (OUTPATIENT)
Dept: FAMILY MEDICINE | Facility: OTHER | Age: 50
End: 2020-08-24

## 2020-08-24 ENCOUNTER — HOSPITAL ENCOUNTER (EMERGENCY)
Facility: CLINIC | Age: 50
Discharge: HOME | End: 2020-08-24
Attending: EMERGENCY MEDICINE | Admitting: EMERGENCY MEDICINE
Payer: COMMERCIAL

## 2020-08-24 VITALS
DIASTOLIC BLOOD PRESSURE: 91 MMHG | HEART RATE: 90 BPM | TEMPERATURE: 98.6 F | OXYGEN SATURATION: 97 % | WEIGHT: 220 LBS | RESPIRATION RATE: 18 BRPM | BODY MASS INDEX: 38.26 KG/M2 | SYSTOLIC BLOOD PRESSURE: 114 MMHG

## 2020-08-24 DIAGNOSIS — M25.511 ACUTE PAIN OF RIGHT SHOULDER: ICD-10-CM

## 2020-08-24 DIAGNOSIS — F41.9 ANXIETY: ICD-10-CM

## 2020-08-24 DIAGNOSIS — M26.609 TMJ (TEMPOROMANDIBULAR JOINT SYNDROME): ICD-10-CM

## 2020-08-24 LAB
ALBUMIN SERPL-MCNC: 3.4 G/DL (ref 3.4–5)
ALBUMIN UR-MCNC: NEGATIVE MG/DL
ALP SERPL-CCNC: 57 U/L (ref 40–150)
ALT SERPL W P-5'-P-CCNC: 14 U/L (ref 0–50)
ANION GAP SERPL CALCULATED.3IONS-SCNC: 5 MMOL/L (ref 3–14)
APPEARANCE UR: CLEAR
AST SERPL W P-5'-P-CCNC: 10 U/L (ref 0–45)
BASOPHILS # BLD AUTO: 0 10E9/L (ref 0–0.2)
BASOPHILS NFR BLD AUTO: 0.5 %
BILIRUB SERPL-MCNC: 0.4 MG/DL (ref 0.2–1.3)
BILIRUB UR QL STRIP: NEGATIVE
BUN SERPL-MCNC: 13 MG/DL (ref 7–30)
CALCIUM SERPL-MCNC: 8.7 MG/DL (ref 8.5–10.1)
CHLORIDE SERPL-SCNC: 108 MMOL/L (ref 94–109)
CO2 SERPL-SCNC: 26 MMOL/L (ref 20–32)
COLOR UR AUTO: YELLOW
CREAT SERPL-MCNC: 0.6 MG/DL (ref 0.52–1.04)
CRP SERPL-MCNC: 6.8 MG/L (ref 0–8)
DIFFERENTIAL METHOD BLD: NORMAL
EOSINOPHIL NFR BLD AUTO: 1.8 %
ERYTHROCYTE [DISTWIDTH] IN BLOOD BY AUTOMATED COUNT: 13.2 % (ref 10–15)
ERYTHROCYTE [SEDIMENTATION RATE] IN BLOOD BY WESTERGREN METHOD: 14 MM/H (ref 0–30)
GFR SERPL CREATININE-BSD FRML MDRD: >90 ML/MIN/{1.73_M2}
GLUCOSE SERPL-MCNC: 96 MG/DL (ref 70–99)
GLUCOSE UR STRIP-MCNC: NEGATIVE MG/DL
HCT VFR BLD AUTO: 37.8 % (ref 35–47)
HGB BLD-MCNC: 12.6 G/DL (ref 11.7–15.7)
HGB UR QL STRIP: NEGATIVE
IMM GRANULOCYTES # BLD: 0 10E9/L (ref 0–0.4)
IMM GRANULOCYTES NFR BLD: 0.3 %
KETONES UR STRIP-MCNC: NEGATIVE MG/DL
LEUKOCYTE ESTERASE UR QL STRIP: ABNORMAL
LYMPHOCYTES # BLD AUTO: 1.7 10E9/L (ref 0.8–5.3)
LYMPHOCYTES NFR BLD AUTO: 28.1 %
MCH RBC QN AUTO: 28.5 PG (ref 26.5–33)
MCHC RBC AUTO-ENTMCNC: 33.3 G/DL (ref 31.5–36.5)
MCV RBC AUTO: 86 FL (ref 78–100)
MONOCYTES # BLD AUTO: 0.4 10E9/L (ref 0–1.3)
MONOCYTES NFR BLD AUTO: 7 %
MUCOUS THREADS #/AREA URNS LPF: PRESENT /LPF
NEUTROPHILS # BLD AUTO: 3.8 10E9/L (ref 1.6–8.3)
NEUTROPHILS NFR BLD AUTO: 62.3 %
NITRATE UR QL: NEGATIVE
NRBC # BLD AUTO: 0 10*3/UL
NRBC BLD AUTO-RTO: 0 /100
PH UR STRIP: 5 PH (ref 5–7)
PLATELET # BLD AUTO: 206 10E9/L (ref 150–450)
POTASSIUM SERPL-SCNC: 4 MMOL/L (ref 3.4–5.3)
PROT SERPL-MCNC: 6.8 G/DL (ref 6.8–8.8)
RBC # BLD AUTO: 4.42 10E12/L (ref 3.8–5.2)
RBC #/AREA URNS AUTO: 0 /HPF (ref 0–2)
SODIUM SERPL-SCNC: 139 MMOL/L (ref 133–144)
SOURCE: ABNORMAL
SP GR UR STRIP: 1.02 (ref 1–1.03)
SQUAMOUS #/AREA URNS AUTO: 6 /HPF (ref 0–1)
TROPONIN I SERPL-MCNC: <0.015 UG/L (ref 0–0.04)
TSH SERPL DL<=0.005 MIU/L-ACNC: 2.65 MU/L (ref 0.4–4)
UROBILINOGEN UR STRIP-MCNC: 0 MG/DL (ref 0–2)
WBC # BLD AUTO: 6.1 10E9/L (ref 4–11)
WBC #/AREA URNS AUTO: 1 /HPF (ref 0–5)

## 2020-08-24 PROCEDURE — 93010 ELECTROCARDIOGRAM REPORT: CPT | Mod: Z6 | Performed by: EMERGENCY MEDICINE

## 2020-08-24 PROCEDURE — 86140 C-REACTIVE PROTEIN: CPT | Performed by: EMERGENCY MEDICINE

## 2020-08-24 PROCEDURE — 93005 ELECTROCARDIOGRAM TRACING: CPT | Performed by: EMERGENCY MEDICINE

## 2020-08-24 PROCEDURE — 99285 EMERGENCY DEPT VISIT HI MDM: CPT | Mod: 25 | Performed by: EMERGENCY MEDICINE

## 2020-08-24 PROCEDURE — 25000132 ZZH RX MED GY IP 250 OP 250 PS 637: Performed by: EMERGENCY MEDICINE

## 2020-08-24 PROCEDURE — 84443 ASSAY THYROID STIM HORMONE: CPT | Performed by: EMERGENCY MEDICINE

## 2020-08-24 PROCEDURE — 84484 ASSAY OF TROPONIN QUANT: CPT | Performed by: EMERGENCY MEDICINE

## 2020-08-24 PROCEDURE — 85652 RBC SED RATE AUTOMATED: CPT | Performed by: EMERGENCY MEDICINE

## 2020-08-24 PROCEDURE — 80053 COMPREHEN METABOLIC PANEL: CPT | Performed by: EMERGENCY MEDICINE

## 2020-08-24 PROCEDURE — 81001 URINALYSIS AUTO W/SCOPE: CPT | Performed by: EMERGENCY MEDICINE

## 2020-08-24 PROCEDURE — 99285 EMERGENCY DEPT VISIT HI MDM: CPT | Performed by: EMERGENCY MEDICINE

## 2020-08-24 PROCEDURE — 73030 X-RAY EXAM OF SHOULDER: CPT | Mod: TC,RT

## 2020-08-24 PROCEDURE — 85025 COMPLETE CBC W/AUTO DIFF WBC: CPT | Performed by: EMERGENCY MEDICINE

## 2020-08-24 RX ORDER — DIAZEPAM 5 MG
5 TABLET ORAL ONCE
Status: COMPLETED | OUTPATIENT
Start: 2020-08-24 | End: 2020-08-24

## 2020-08-24 RX ORDER — HYDROXYZINE HYDROCHLORIDE 25 MG/1
50 TABLET, FILM COATED ORAL EVERY 6 HOURS PRN
Qty: 18 TABLET | Refills: 0 | Status: SHIPPED | OUTPATIENT
Start: 2020-08-24

## 2020-08-24 RX ADMIN — DIAZEPAM 5 MG: 5 TABLET ORAL at 12:32

## 2020-08-24 NOTE — ED PROVIDER NOTES
"  History     Chief Complaint   Patient presents with     Jaw Pain     Back Pain     HPI  Lilian Middleton is a 50 year old female who presents to the ER with multiple concerns.  She initially was primary provider today, and was going to bring up the concerns at that time.  Primarily, she was going to see her provider to discuss her depression and anxiety.  She is already taking Celexa, 10 mg daily, and has been on this dose for \"a while\".  She feels that over the last month or so things have been getting worse.  She feels much more depressed, is very fatigued throughout the entire day and has no motivation to do things.  She is also quite anxious.  She says that \"the general world\" is making her feel this way right now.  She denies any suicidal ideation.    Lilian also has complaints of jaw and shoulder pain.  She is been experiencing pain in her left jaw for the last 1 month.  She says it feels like a muscle is tightening.  She does not have any sharp shooting pain.  Also feels like her ear is very full.  Not have any drainage from that ear.  She is at the same time experiencing right shoulder pain.  Worse in her posterior right shoulder.  She says that about 2 months ago she was taking her dog outside and the dog jerked forward on the leash and pulled her quite a bit, but she did not recall the pain being present after that incident.  She otherwise has not had a fall or any injury.  She is right-hand dominant.  Does not have any numbness or tingling or pain down her arm.    Finally, she was going to address a rash that she had noticed on her left lower leg, which seems to have resolved.  About 1 month ago, she noticed a bruise develop on her inner left thigh.  It then cleared up but she had a rash over that area.  That was itchy.  She then noticed a small scab in the middle of the bruise and rash area.  It has since cleared of but she was wondering what this could have been.  Has not noticed any rashes or " bruising anywhere else on her body.    Allergies:  No Known Allergies    Problem List:    Patient Active Problem List    Diagnosis Date Noted     Migraine without aura and without status migrainosus, not intractable 08/21/2019     Priority: Medium     Morbid obesity (H) 06/29/2018     Priority: Medium     Moderate episode of recurrent major depressive disorder (H) 04/18/2018     Priority: Medium     Exercise-induced asthma 04/09/2018     Priority: Medium     Cervical cancer screening      Priority: Medium     2006 & 2012 both NIL paps in Care Everywhere  10/20/17 NIL pap/neg HR HPV           Status post wrist surgery 10/19/2016     Priority: Medium     Distal radius fracture 10/05/2016     Priority: Medium     Family history of esophageal cancer 12/18/2013     Priority: Medium     Overview:   Father (pt thinks adeno)        Seasonal allergic rhinitis 12/18/2013     Priority: Medium     Overview:   Spring/fall   Or weather changes        Cholelithiasis 06/19/2013     Priority: Medium     Overview:   seenonCT   Small . Probably insignificant       Epiploic appendagitis 06/19/2013     Priority: Medium     Other psoriasis 08/07/2012     Priority: Medium        Past Medical History:    No past medical history on file.    Past Surgical History:    Past Surgical History:   Procedure Laterality Date     APPLY EXTERNAL FIXATOR UPPER EXTREMITY Left 10/10/2016    Procedure: APPLY EXTERNAL FIXATOR UPPER EXTREMITY;  Surgeon: Yuli Berrios MD;  Location: [facility] OR       Family History:    Family History   Problem Relation Age of Onset     Other Cancer Father        Social History:  Marital Status:   [2]  Social History     Tobacco Use     Smoking status: Never Smoker     Smokeless tobacco: Never Used   Substance Use Topics     Alcohol use: No     Alcohol/week: 0.0 standard drinks     Drug use: No        Medications:    hydrOXYzine (ATARAX) 25 MG tablet  albuterol (PROAIR HFA/PROVENTIL HFA/VENTOLIN HFA) 108 (90 BASE)  MCG/ACT Inhaler  citalopram (CELEXA) 10 MG tablet  ketoconazole (NIZORAL) 2 % external cream  triamcinolone (KENALOG) 0.1 % external cream          Review of Systems   All other systems reviewed and are negative.      Physical Exam   BP: (!) 171/102  Pulse: 70  Temp: 98.6  F (37  C)  Resp: 18  Weight: 99.8 kg (220 lb)  SpO2: 97 %      Physical Exam  Vitals signs and nursing note reviewed.   Constitutional:       General: She is not in acute distress.     Appearance: She is obese. She is not diaphoretic.   HENT:      Head: Atraumatic.      Jaw: There is normal jaw occlusion. No pain on movement.        Right Ear: Tympanic membrane normal.      Left Ear: Tympanic membrane normal.      Nose: Nose normal.      Mouth/Throat:      Mouth: Mucous membranes are moist.      Pharynx: No oropharyngeal exudate.   Eyes:      General: No scleral icterus.     Extraocular Movements: Extraocular movements intact.      Pupils: Pupils are equal, round, and reactive to light.   Neck:      Musculoskeletal: Normal range of motion and neck supple. No neck rigidity.   Cardiovascular:      Rate and Rhythm: Normal rate and regular rhythm.      Heart sounds: Normal heart sounds.   Pulmonary:      Effort: Pulmonary effort is normal. No respiratory distress.      Breath sounds: Normal breath sounds.   Abdominal:      General: Bowel sounds are normal.      Palpations: Abdomen is soft.      Tenderness: There is no abdominal tenderness.   Musculoskeletal: Normal range of motion.         General: No swelling or tenderness.   Skin:     General: Skin is warm.      Findings: No bruising, lesion or rash.   Neurological:      Mental Status: She is alert.   Psychiatric:         Mood and Affect: Mood is anxious.         ED Course        Procedures               EKG Interpretation:      Interpreted by Kisha Del Real DO  Time reviewed: 1155  Symptoms at time of EKG: left jaw pain and right shoulder pain   Rhythm: normal sinus   Rate: Normal and 68  bpm  Ectopy: none  Conduction: normal  ST Segments/ T Waves: No ST-T wave changes  Comparison to prior: Unchanged    Clinical Impression: no acute changes                Critical Care time:  none               Results for orders placed or performed during the hospital encounter of 08/24/20 (from the past 24 hour(s))   CBC with platelets differential   Result Value Ref Range    WBC 6.1 4.0 - 11.0 10e9/L    RBC Count 4.42 3.8 - 5.2 10e12/L    Hemoglobin 12.6 11.7 - 15.7 g/dL    Hematocrit 37.8 35.0 - 47.0 %    MCV 86 78 - 100 fl    MCH 28.5 26.5 - 33.0 pg    MCHC 33.3 31.5 - 36.5 g/dL    RDW 13.2 10.0 - 15.0 %    Platelet Count 206 150 - 450 10e9/L    Diff Method Automated Method     % Neutrophils 62.3 %    % Lymphocytes 28.1 %    % Monocytes 7.0 %    % Eosinophils 1.8 %    % Basophils 0.5 %    % Immature Granulocytes 0.3 %    Nucleated RBCs 0 0 /100    Absolute Neutrophil 3.8 1.6 - 8.3 10e9/L    Absolute Lymphocytes 1.7 0.8 - 5.3 10e9/L    Absolute Monocytes 0.4 0.0 - 1.3 10e9/L    Absolute Basophils 0.0 0.0 - 0.2 10e9/L    Abs Immature Granulocytes 0.0 0 - 0.4 10e9/L    Absolute Nucleated RBC 0.0    Comprehensive metabolic panel   Result Value Ref Range    Sodium 139 133 - 144 mmol/L    Potassium 4.0 3.4 - 5.3 mmol/L    Chloride 108 94 - 109 mmol/L    Carbon Dioxide 26 20 - 32 mmol/L    Anion Gap 5 3 - 14 mmol/L    Glucose 96 70 - 99 mg/dL    Urea Nitrogen 13 7 - 30 mg/dL    Creatinine 0.60 0.52 - 1.04 mg/dL    GFR Estimate >90 >60 mL/min/[1.73_m2]    GFR Estimate If Black >90 >60 mL/min/[1.73_m2]    Calcium 8.7 8.5 - 10.1 mg/dL    Bilirubin Total 0.4 0.2 - 1.3 mg/dL    Albumin 3.4 3.4 - 5.0 g/dL    Protein Total 6.8 6.8 - 8.8 g/dL    Alkaline Phosphatase 57 40 - 150 U/L    ALT 14 0 - 50 U/L    AST 10 0 - 45 U/L   Troponin I   Result Value Ref Range    Troponin I ES <0.015 0.000 - 0.045 ug/L   CRP inflammation   Result Value Ref Range    CRP Inflammation 6.8 0.0 - 8.0 mg/L   Erythrocyte sedimentation rate auto    Result Value Ref Range    Sed Rate 14 0 - 30 mm/h   TSH with free T4 reflex   Result Value Ref Range    TSH 2.65 0.40 - 4.00 mU/L   UA reflex to Microscopic and Culture    Specimen: Midstream Urine   Result Value Ref Range    Color Urine Yellow     Appearance Urine Clear     Glucose Urine Negative NEG^Negative mg/dL    Bilirubin Urine Negative NEG^Negative    Ketones Urine Negative NEG^Negative mg/dL    Specific Gravity Urine 1.017 1.003 - 1.035    Blood Urine Negative NEG^Negative    pH Urine 5.0 5.0 - 7.0 pH    Protein Albumin Urine Negative NEG^Negative mg/dL    Urobilinogen mg/dL 0.0 0.0 - 2.0 mg/dL    Nitrite Urine Negative NEG^Negative    Leukocyte Esterase Urine Trace (A) NEG^Negative    Source Midstream Urine     RBC Urine 0 0 - 2 /HPF    WBC Urine 1 0 - 5 /HPF    Squamous Epithelial /HPF Urine 6 (H) 0 - 1 /HPF    Mucous Urine Present (A) NEG^Negative /LPF   XR Shoulder Right G/E 3 Views    Narrative    XR SHOULDER RT G/E 3 VW 8/24/2020 1:21 PM     HISTORY: shoulder pain    COMPARISON: None.      Impression    IMPRESSION: No fractures are evident. Normal glenohumeral alignment.  Mild hypertrophic changes in the inferior glenoid. The  acromioclavicular joint is unremarkable.     LUIS M ALEXANDER MD       Medications   diazepam (VALIUM) tablet 5 mg (5 mg Oral Given 8/24/20 1232)       Assessments & Plan (with Medical Decision Making)  Lilian is a 50-year-old female who presents to the ER with couple concerns.  She feels like she may have just had a panic attack and some acute anxiety, but is also concerned about jaw pain and shoulder pain.  History and focused physical exam as above  Obese female in no acute distress, mildly anxious, vital signs are stable, she is afebrile.  EKG was obtained which shows normal sinus rhythm without acute ST changes, unchanged from a previous EKG.  Checked lab work, results as above.  Opponent is undetectable, remainder of her labs are within normal limits.  She has trace  leukocyte Estrace and mucus present on UA, but no urinary symptoms.  Was given a dose of diazepam in the ER, which did help calm some of the anxiety.  Her shoulder x-ray was negative.  Had long discussion with the patient, seems to be that her anxiety is quite high in regards to the novel coronavirus pandemic.  She is also been depressed and is only on a low dose of Celexa, which may not be as effective for her anymore.  Do not have any acute findings to keep the patient hospitalized at this time.  Suggested a trial of hydroxyzine on an outpatient basis to help with her anxiety.  Patient also tends to itch and scratch at dry skin on her hands, this may help with some pruritus.  She is to follow-up closely with her primary provider to see how the medication is helping her, and may need adjustment of her antidepressant at that time.  If she continues to have pains or other concerns, she may need further work-up at that time.  Discussed reasons to return to the ER.  She understands and is agreeable to this plan.  Prescription was sent to the pharmacy is discharged in no acute distress     I have reviewed the nursing notes.    I have reviewed the findings, diagnosis, plan and need for follow up with the patient.       Discharge Medication List as of 8/24/2020  2:31 PM      START taking these medications    Details   hydrOXYzine (ATARAX) 25 MG tablet Take 2 tablets (50 mg) by mouth every 6 hours as needed for itching or anxiety, Disp-18 tablet,R-0, E-Prescribe             Final diagnoses:   Anxiety   TMJ (temporomandibular joint syndrome)   Acute pain of right shoulder       8/24/2020   [facility] EMERGENCY DEPARTMENT     Kisha Del Real DO  08/24/20 2041

## 2020-08-24 NOTE — TELEPHONE ENCOUNTER
"Patient calls in regards to mood first. Patient bounces all over with symptoms she has been experiencing for the past 1-2 weeks. Off and on. Some confusion with starting and severity of symptoms. Patient admits to jaw and shoulder pain, along with headache. Worsening this AM. Unable to accurectly assess if she lost vision or experienced double vision but admits to changes with vision. Patient advised to present to ED. Mother will drive. Advised patient if symptoms worsen, any shortness of breath, severe headache, or chest pain to call 911    Emma Treviño RN      1. LOCATION: \"Where does it hurt?\"       Head, admits to jaw and shoulder pain, no shortness of breath or chest pain  2. ONSET: \"When did the headache start?\" (Minutes, hours or days)       Per patient off and on x 1 week, but worse today    3. PATTERN: \"Does the pain come and go, or has it been constant since it started?\"      Constant this AM    4. SEVERITY: \"How bad is the pain?\" and \"What does it keep you from doing?\"  (e.g., Scale 1-10; mild, moderate, or severe)    - MILD (1-3): doesn't interfere with normal activities     - MODERATE (4-7): interferes with normal activities or awakens from sleep     - SEVERE (8-10): excruciating pain, unable to do any normal activities         Patient is tearful and explains positive intermittent jaw and shoulder pain    5. RECURRENT SYMPTOM: \"Have you ever had headaches before?\" If so, ask: \"When was the last time?\" and \"What happened that time?\"       No    6. CAUSE: \"What do you think is causing the headache?\"      Unsure    7. MIGRAINE: \"Have you been diagnosed with migraine headaches?\" If so, ask: \"Is this headache similar?\"       No    8. HEAD INJURY: \"Has there been any recent injury to the head?\"       No injury    9. OTHER SYMPTOMS: \"Do you have any other symptoms?\" (fever, stiff neck, eye pain, sore throat, cold symptoms)      Eye pain and unable to recall loss of vision but said it is affected    10. " "PREGNANCY: \"Is there any chance you are pregnant?\" \"When was your last menstrual period?\"        KRSI Treviño RN    Additional Information    Negative: Difficult to awaken or acting confused (e.g., disoriented, slurred speech)    Negative: Weakness of the face, arm or leg on one side of the body and new onset    Negative: Numbness of the face, arm or leg on one side of the body and new onset    Negative: Loss of speech or garbled speech and new onset    Negative: Passed out (i.e., fainted, collapsed and was not responding)    Negative: Sounds like a life-threatening emergency to the triager    Negative: Followed a head injury within last 3 days    Negative: Traumatic Brain Injury (TBI) is suspected    Negative: Sinus pain of forehead and yellow or green nasal discharge    Negative: Pregnant    Negative: Unable to walk without falling    Negative: Stiff neck (can't touch chin to chest)    Negative: Possibility of carbon monoxide exposure    Loss of vision or double vision    Protocols used: HEADACHE-A-OH    "

## 2020-08-24 NOTE — ED TRIAGE NOTES
Pt is depressed, tearful,  Here with left sided jaw pain, right sided shoulder and back pain. Concerns about bruise on     Patient's airway, breathing, circulation, and disability/mental status (ABCDs) intact/WDL during triage.

## 2020-08-24 NOTE — DISCHARGE INSTRUCTIONS
Your lab work, EKG, and x-rays today were normal    You should try to take the Atarax to help with anxiety.  It also helps with itching.  You can take it up to 4 times a day as needed.    May continue to take the Aleve you have been taking at home to help with your plantar fasciitis symptoms.  You can take a maximum of 2 tablets morning and evening (every 12 hours).  This will help with pain and inflammation.  Do not take ibuprofen while you are taking this since they are similar types of medication and it could hurt your stomach.  You could alternate with Tylenol, and may take 2 tablets of extra strength Tylenol every 8 hours as needed      Follow-up with your primary provider.  They may want to adjust your medications and make sure that the Atarax is beneficial to you.  They may also recommend further work-up if your symptoms persist    Return to the ER if you have any new or worsening symptoms

## 2020-08-24 NOTE — ED AVS SNAPSHOT
[facility] Emergency Department  [address] [place] [address]  Phone:  [phone]  Fax:  [phone]                                    Lilian Middleton   MRN: [id number]    Department:  [facility] Emergency Department   Date of Visit:  8/24/2020           After Visit Summary Signature Page    I have received my discharge instructions, and my questions have been answered. I have discussed any challenges I see with this plan with the nurse or doctor.    ..........................................................................................................................................  Patient/Patient Representative Signature      ..........................................................................................................................................  Patient Representative Print Name and Relationship to Patient    ..................................................               ................................................  Date                                   Time    ..........................................................................................................................................  Reviewed by Signature/Title    ...................................................              ..............................................  Date                                               Time          22EPIC Rev 08/18

## 2024-05-17 ENCOUNTER — OFFICE VISIT (OUTPATIENT)
Dept: FAMILY MEDICINE | Facility: CLINIC | Age: 54
End: 2024-05-17

## 2024-05-17 VITALS
BODY MASS INDEX: 33.04 KG/M2 | WEIGHT: 190 LBS | DIASTOLIC BLOOD PRESSURE: 81 MMHG | HEART RATE: 82 BPM | TEMPERATURE: 98 F | OXYGEN SATURATION: 96 % | SYSTOLIC BLOOD PRESSURE: 120 MMHG

## 2024-05-17 DIAGNOSIS — F41.9 ANXIETY: ICD-10-CM

## 2024-05-17 DIAGNOSIS — H69.93 DYSFUNCTION OF BOTH EUSTACHIAN TUBES: Primary | ICD-10-CM

## 2024-05-17 DIAGNOSIS — F33.1 MODERATE EPISODE OF RECURRENT MAJOR DEPRESSIVE DISORDER (H): ICD-10-CM

## 2024-05-17 DIAGNOSIS — R23.3 EASY BRUISING: ICD-10-CM

## 2024-05-17 LAB
BASOPHILS # BLD AUTO: 0 10E3/UL (ref 0–0.2)
BASOPHILS NFR BLD AUTO: 1 %
EOSINOPHIL # BLD AUTO: 0.1 10E3/UL (ref 0–0.7)
EOSINOPHIL NFR BLD AUTO: 2 %
ERYTHROCYTE [DISTWIDTH] IN BLOOD BY AUTOMATED COUNT: 13 % (ref 10–15)
HCT VFR BLD AUTO: 42.3 % (ref 35–47)
HGB BLD-MCNC: 13.9 G/DL (ref 11.7–15.7)
IMM GRANULOCYTES # BLD: 0 10E3/UL
IMM GRANULOCYTES NFR BLD: 0 %
LYMPHOCYTES # BLD AUTO: 2.1 10E3/UL (ref 0.8–5.3)
LYMPHOCYTES NFR BLD AUTO: 31 %
MCH RBC QN AUTO: 28 PG (ref 26.5–33)
MCHC RBC AUTO-ENTMCNC: 32.9 G/DL (ref 31.5–36.5)
MCV RBC AUTO: 85 FL (ref 78–100)
MONOCYTES # BLD AUTO: 0.6 10E3/UL (ref 0–1.3)
MONOCYTES NFR BLD AUTO: 8 %
NEUTROPHILS # BLD AUTO: 4 10E3/UL (ref 1.6–8.3)
NEUTROPHILS NFR BLD AUTO: 58 %
PLATELET # BLD AUTO: 279 10E3/UL (ref 150–450)
RBC # BLD AUTO: 4.97 10E6/UL (ref 3.8–5.2)
WBC # BLD AUTO: 6.9 10E3/UL (ref 4–11)

## 2024-05-17 PROCEDURE — 36415 COLL VENOUS BLD VENIPUNCTURE: CPT | Performed by: NURSE PRACTITIONER

## 2024-05-17 PROCEDURE — 85025 COMPLETE CBC W/AUTO DIFF WBC: CPT | Performed by: NURSE PRACTITIONER

## 2024-05-17 PROCEDURE — 99214 OFFICE O/P EST MOD 30 MIN: CPT | Performed by: NURSE PRACTITIONER

## 2024-05-17 PROCEDURE — G2211 COMPLEX E/M VISIT ADD ON: HCPCS | Performed by: NURSE PRACTITIONER

## 2024-05-17 RX ORDER — BENZONATATE 100 MG/1
100 CAPSULE ORAL 3 TIMES DAILY PRN
COMMUNITY
Start: 2024-03-23

## 2024-05-17 RX ORDER — PREDNISONE 10 MG/1
TABLET ORAL
Qty: 18 TABLET | Refills: 0 | Status: SHIPPED | OUTPATIENT
Start: 2024-05-17 | End: 2024-05-25

## 2024-05-17 RX ORDER — CETIRIZINE HYDROCHLORIDE 10 MG/1
10 TABLET ORAL DAILY
Qty: 30 TABLET | Refills: 0 | Status: SHIPPED | OUTPATIENT
Start: 2024-05-17

## 2024-05-17 RX ORDER — DIPHENHYDRAMINE HCL 25 MG
25 TABLET ORAL PRN
COMMUNITY

## 2024-05-17 RX ORDER — PSEUDOEPHEDRINE HCL 120 MG/1
120 TABLET, FILM COATED, EXTENDED RELEASE ORAL PRN
COMMUNITY

## 2024-05-17 RX ORDER — ESCITALOPRAM OXALATE 10 MG/1
10 TABLET ORAL DAILY
Qty: 90 TABLET | Refills: 1 | Status: SHIPPED | OUTPATIENT
Start: 2024-05-17

## 2024-05-17 RX ORDER — COVID-19 ANTIGEN TEST
220 KIT MISCELLANEOUS PRN
COMMUNITY

## 2024-05-17 ASSESSMENT — PAIN SCALES - GENERAL: PAINLEVEL: SEVERE PAIN (7)

## 2024-05-17 NOTE — PROGRESS NOTES
Assessment & Plan     Dysfunction of both eustachian tubes  Start prednisone and Zyrtec.  Follow-up if not improving.   - predniSONE (DELTASONE) 10 MG tablet; Take 3 tablets (30 mg) by mouth daily for 3 days, THEN 2 tablets (20 mg) daily for 3 days, THEN 1 tablet (10 mg) daily for 3 days.  - cetirizine (ZYRTEC) 10 MG tablet; Take 1 tablet (10 mg) by mouth daily    Moderate episode of recurrent major depressive disorder (H)  Start lexapro. Follow-up if 3 months, sooner if concerns.   - escitalopram (LEXAPRO) 10 MG tablet; Take 1 tablet (10 mg) by mouth daily    Anxiety  See above.   - escitalopram (LEXAPRO) 10 MG tablet; Take 1 tablet (10 mg) by mouth daily    Easy bruising  No significant bruising noted on exam.  CBC normal. Continue to monitor.   - CBC with platelets and differential      Subjective   Lilian is a 53 year old, presenting for the following health issues:  Ear Problem        5/17/2024     2:48 PM   Additional Questions   Roomed by glory     Ear Problem    History of Present Illness       Reason for visit:  Ears are bothering me    She eats 0-1 servings of fruits and vegetables daily.She consumes 1 sweetened beverage(s) daily.She exercises with enough effort to increase her heart rate 9 or less minutes per day.  She exercises with enough effort to increase her heart rate 3 or less days per week.      Follow-up ear concern.     Was seen in March at a [facility]. Ears hurt really bad at that time.  Double ear infection.  Amoxicillin x 10 days.  Wasn't feeling better so she saw [facility] again, probably early April.  That provider gave her 7 days of cefdinir. Since then ears better but still not fully resolved.  States overall feeling better than she did a few months ago but ears are driving her crazy.  Fullness and pain.       History of allergies. When it's been really bad she takes benadryl.     Bruising more often.     Rash left upper leg    Having episodes of crying, feeling irritable.    not working, she is working 2 jobs. Brother passed away in February. Overall feels happy and has been trying to reduce stress.          Review of Systems  Constitutional, neuro, ENT, endocrine, pulmonary, cardiac, gastrointestinal, genitourinary, musculoskeletal, integument and psychiatric systems are negative, except as otherwise noted.            Objective    /81   Pulse 82   Temp 98  F (36.7  C)   Wt 86.2 kg (190 lb)   SpO2 96%   BMI 33.04 kg/m    Body mass index is 33.04 kg/m .  Physical Exam   GENERAL: alert and no distress  EYES: Eyes grossly normal to inspection, PERRL and conjunctivae and sclerae normal  HENT: ear canals and TM's normal, nose and mouth without ulcers or lesions  NECK: no adenopathy, no asymmetry, masses, or scars  RESP: lungs clear to auscultation - no rales, rhonchi or wheezes  CV: regular rate and rhythm, normal S1 S2, no S3 or S4, no murmur, click or rub, no peripheral edema  MS: no gross musculoskeletal defects noted, no edema  NEURO: Normal strength and tone, mentation intact and speech normal  PSYCH: mentation appears normal and tearful        CBC RESULTS:   Recent Labs   Lab Test 05/17/24  1535   WBC 6.9   RBC 4.97   HGB 13.9   HCT 42.3   MCV 85   MCH 28.0   MCHC 32.9   RDW 13.0            Signed Electronically by: Monica Wilks, CNP

## 2024-05-17 NOTE — LETTER
May 20, 2024      Lilian Middleton  [address] [place] [address]          Lilian,     Your blood counts are normal.     Monica Wilks CNP     Resulted Orders   CBC with platelets and differential   Result Value Ref Range    WBC Count 6.9 4.0 - 11.0 10e3/uL    RBC Count 4.97 3.80 - 5.20 10e6/uL    Hemoglobin 13.9 11.7 - 15.7 g/dL    Hematocrit 42.3 35.0 - 47.0 %    MCV 85 78 - 100 fL    MCH 28.0 26.5 - 33.0 pg    MCHC 32.9 31.5 - 36.5 g/dL    RDW 13.0 10.0 - 15.0 %    Platelet Count 279 150 - 450 10e3/uL    % Neutrophils 58 %    % Lymphocytes 31 %    % Monocytes 8 %    % Eosinophils 2 %    % Basophils 1 %    % Immature Granulocytes 0 %    Absolute Neutrophils 4.0 1.6 - 8.3 10e3/uL    Absolute Lymphocytes 2.1 0.8 - 5.3 10e3/uL    Absolute Monocytes 0.6 0.0 - 1.3 10e3/uL    Absolute Eosinophils 0.1 0.0 - 0.7 10e3/uL    Absolute Basophils 0.0 0.0 - 0.2 10e3/uL    Absolute Immature Granulocytes 0.0 <=0.4 10e3/uL       If you have any questions or concerns, please call the clinic at the number listed above.

## 2024-05-17 NOTE — PATIENT INSTRUCTIONS
Ears- start prednisone (steroid), this will be a tapered dose.   Zyrtec 10 mg once daily in the morning during allergy season.      Mood- start lexapro 10 mg once daily. For the first week, take only 1/2 pill per day.  If doing okay, then increase to a full pill daily.       Checking blood counts because of bruising.